# Patient Record
Sex: MALE | Race: WHITE | NOT HISPANIC OR LATINO | Employment: FULL TIME | URBAN - METROPOLITAN AREA
[De-identification: names, ages, dates, MRNs, and addresses within clinical notes are randomized per-mention and may not be internally consistent; named-entity substitution may affect disease eponyms.]

---

## 2023-04-21 ENCOUNTER — HOSPITAL ENCOUNTER (OUTPATIENT)
Dept: DATA CONVERSION | Facility: HOSPITAL | Age: 58
End: 2023-04-21
Attending: INTERNAL MEDICINE | Admitting: INTERNAL MEDICINE
Payer: COMMERCIAL

## 2023-04-21 DIAGNOSIS — K51.90 ULCERATIVE COLITIS, UNSPECIFIED, WITHOUT COMPLICATIONS (MULTI): ICD-10-CM

## 2023-04-21 DIAGNOSIS — K64.4 RESIDUAL HEMORRHOIDAL SKIN TAGS: ICD-10-CM

## 2023-04-21 DIAGNOSIS — Z12.11 ENCOUNTER FOR SCREENING FOR MALIGNANT NEOPLASM OF COLON: ICD-10-CM

## 2023-04-21 DIAGNOSIS — K51.80 OTHER ULCERATIVE COLITIS WITHOUT COMPLICATIONS (MULTI): ICD-10-CM

## 2023-05-01 LAB
COMPLETE PATHOLOGY REPORT: NORMAL
CONVERTED CLINICAL DIAGNOSIS-HISTORY: NORMAL
CONVERTED FINAL DIAGNOSIS: NORMAL
CONVERTED FINAL REPORT PDF LINK TO COPY AND PASTE: NORMAL
CONVERTED GROSS DESCRIPTION: NORMAL

## 2023-08-03 ENCOUNTER — APPOINTMENT (OUTPATIENT)
Dept: PRIMARY CARE | Facility: CLINIC | Age: 58
End: 2023-08-03
Payer: COMMERCIAL

## 2023-08-15 ENCOUNTER — OFFICE VISIT (OUTPATIENT)
Dept: PRIMARY CARE | Facility: CLINIC | Age: 58
End: 2023-08-15
Payer: COMMERCIAL

## 2023-08-15 VITALS
HEART RATE: 102 BPM | OXYGEN SATURATION: 92 % | DIASTOLIC BLOOD PRESSURE: 80 MMHG | SYSTOLIC BLOOD PRESSURE: 122 MMHG | BODY MASS INDEX: 30.72 KG/M2 | WEIGHT: 208 LBS | TEMPERATURE: 96.4 F

## 2023-08-15 DIAGNOSIS — S69.91XA INJURY OF RIGHT WRIST, INITIAL ENCOUNTER: Primary | ICD-10-CM

## 2023-08-15 PROCEDURE — 99214 OFFICE O/P EST MOD 30 MIN: CPT | Performed by: FAMILY MEDICINE

## 2023-08-15 RX ORDER — INFLIXIMAB 100 MG/10ML
900 INJECTION, POWDER, LYOPHILIZED, FOR SOLUTION INTRAVENOUS
COMMUNITY
Start: 2023-04-20 | End: 2023-10-17 | Stop reason: WASHOUT

## 2023-08-15 RX ORDER — IBUPROFEN 800 MG/1
1 TABLET ORAL
COMMUNITY
Start: 2022-11-13

## 2023-08-15 RX ORDER — VIT C/E/ZN/COPPR/LUTEIN/ZEAXAN 250MG-90MG
1 CAPSULE ORAL
COMMUNITY
Start: 2013-11-05

## 2023-08-15 RX ORDER — FOLIC ACID 1 MG/1
TABLET ORAL
COMMUNITY
End: 2024-02-14 | Stop reason: SDUPTHER

## 2023-08-15 RX ORDER — VALACYCLOVIR HYDROCHLORIDE 1 G/1
TABLET, FILM COATED ORAL
COMMUNITY
Start: 2023-03-08

## 2023-08-15 RX ORDER — UBIDECARENONE 30 MG
1 CAPSULE ORAL DAILY
COMMUNITY
Start: 2015-01-15

## 2023-08-15 RX ORDER — GUAIFENESIN 1200 MG
2 TABLET, EXTENDED RELEASE 12 HR ORAL
COMMUNITY
Start: 2018-03-01

## 2023-08-15 RX ORDER — SULFASALAZINE 500 MG/1
1500 TABLET ORAL 2 TIMES DAILY
COMMUNITY
End: 2024-02-14 | Stop reason: SDUPTHER

## 2023-08-15 RX ORDER — SODIUM CHLORIDE 0.9 % (FLUSH) 0.9 %
10 SYRINGE (ML) INJECTION
COMMUNITY
Start: 2023-04-20 | End: 2024-04-20

## 2023-08-15 RX ORDER — HYDROGEN PEROXIDE 3 %
1 SOLUTION, NON-ORAL MISCELLANEOUS
COMMUNITY
Start: 2018-03-01

## 2023-08-15 RX ORDER — EPINEPHRINE 1 MG/ML
0.3 INJECTION, SOLUTION, CONCENTRATE INTRAVENOUS
COMMUNITY
Start: 2023-04-20 | End: 2024-04-20

## 2023-08-15 ASSESSMENT — ENCOUNTER SYMPTOMS
ABDOMINAL PAIN: 1
BLOOD IN STOOL: 0
WEAKNESS: 0
SORE THROAT: 0
APPETITE CHANGE: 0
CONSTIPATION: 1
NUMBNESS: 0
EYE DISCHARGE: 0
FEVER: 0
JOINT SWELLING: 0
DYSURIA: 0
UNEXPECTED WEIGHT CHANGE: 0
HEADACHES: 0
WHEEZING: 0
PALPITATIONS: 0
ACTIVITY CHANGE: 0
DYSPHORIC MOOD: 0
FLANK PAIN: 0
VOMITING: 0
HEMATURIA: 0
SLEEP DISTURBANCE: 0
COUGH: 0
NAUSEA: 0
EYE ITCHING: 0
LIGHT-HEADEDNESS: 0
RHINORRHEA: 0
ARTHRALGIAS: 1
MYALGIAS: 0
SHORTNESS OF BREATH: 0
DIZZINESS: 0
DIARRHEA: 1
NERVOUS/ANXIOUS: 0
COLOR CHANGE: 1
SINUS PRESSURE: 0

## 2023-08-15 NOTE — PROGRESS NOTES
"Subjective   Patient ID: Harvey Owens is a 58 y.o. female who presents for Establish Care and Joint Swelling (Possible \"pinch\" in the R wrist about 3 weeks ago.  Shortly after that he noted a spider that had bit him also, on the R wrist.  He has had on and off swelling since.  Harvey also mentions a HX of gout and surgical procedure on the same wrist.).    HPI PATIENT IS TREATED FOR ULCERATIVE COLITIS AND IS ON REMACAID AND OTHER IMMUNOSUPPRESSANTS   HE HAS NORMAL BLOOD PRESSURE   HIS BMI IS 30     Review of Systems   Constitutional:  Negative for activity change, appetite change, fever and unexpected weight change.   HENT:  Negative for congestion, ear pain, postnasal drip, rhinorrhea, sinus pressure and sore throat.    Eyes:  Negative for discharge, itching and visual disturbance.   Respiratory:  Negative for cough, shortness of breath and wheezing.    Cardiovascular:  Negative for chest pain, palpitations and leg swelling.   Gastrointestinal:  Positive for abdominal pain, constipation and diarrhea. Negative for blood in stool, nausea and vomiting.   Endocrine: Negative for cold intolerance, heat intolerance and polyuria.   Genitourinary:  Negative for dysuria, flank pain and hematuria.   Musculoskeletal:  Positive for arthralgias. Negative for gait problem, joint swelling and myalgias.   Skin:  Positive for color change. Negative for rash.        EXCESSIVELY TANNED    Allergic/Immunologic: Negative for environmental allergies and food allergies.   Neurological:  Negative for dizziness, syncope, weakness, light-headedness, numbness and headaches.   Psychiatric/Behavioral:  Negative for dysphoric mood and sleep disturbance. The patient is not nervous/anxious.        Objective   /80   Pulse 102   Temp 35.8 °C (96.4 °F)   Wt 94.3 kg (208 lb)   SpO2 92%   BMI 30.72 kg/m²     Physical Exam  Constitutional:       Appearance: Normal appearance.   HENT:      Head: Normocephalic and atraumatic.      Nose: Nose " normal.      Mouth/Throat:      Mouth: Mucous membranes are moist.   Eyes:      Extraocular Movements: Extraocular movements intact.      Pupils: Pupils are equal, round, and reactive to light.   Cardiovascular:      Rate and Rhythm: Normal rate and regular rhythm.   Pulmonary:      Effort: Pulmonary effort is normal.      Breath sounds: Normal breath sounds.   Abdominal:      Palpations: Abdomen is soft.   Musculoskeletal:         General: Swelling, tenderness and deformity present. Normal range of motion.      Cervical back: Normal range of motion and neck supple.      Comments: Right wrist swollen and painful    Skin:     General: Skin is warm and dry.   Neurological:      General: No focal deficit present.      Mental Status: She is alert and oriented to person, place, and time.   Psychiatric:         Mood and Affect: Mood normal.         Behavior: Behavior normal.         Assessment/Plan   Diagnoses and all orders for this visit:  Injury of right wrist, initial encounter  -     XR wrist right 3+ views; Future

## 2023-08-21 ENCOUNTER — TELEPHONE (OUTPATIENT)
Dept: PRIMARY CARE | Facility: CLINIC | Age: 58
End: 2023-08-21

## 2023-08-21 ENCOUNTER — APPOINTMENT (OUTPATIENT)
Dept: PRIMARY CARE | Facility: CLINIC | Age: 58
End: 2023-08-21
Payer: COMMERCIAL

## 2023-08-22 DIAGNOSIS — M19.131 POST-TRAUMATIC OSTEOARTHRITIS OF RIGHT WRIST: Primary | ICD-10-CM

## 2023-09-28 VITALS — WEIGHT: 216.05 LBS | BODY MASS INDEX: 31.91 KG/M2

## 2023-09-28 DIAGNOSIS — K51.011 ULCERATIVE PANCOLITIS WITH RECTAL BLEEDING (MULTI): Primary | ICD-10-CM

## 2023-09-28 RX ORDER — DIPHENHYDRAMINE HYDROCHLORIDE 50 MG/ML
50 INJECTION INTRAMUSCULAR; INTRAVENOUS AS NEEDED
Status: CANCELLED | OUTPATIENT
Start: 2023-10-24

## 2023-09-28 RX ORDER — ALBUTEROL SULFATE 0.83 MG/ML
3 SOLUTION RESPIRATORY (INHALATION) AS NEEDED
Status: CANCELLED | OUTPATIENT
Start: 2023-10-24

## 2023-09-28 RX ORDER — EPINEPHRINE 0.3 MG/.3ML
0.3 INJECTION SUBCUTANEOUS EVERY 5 MIN PRN
Status: CANCELLED | OUTPATIENT
Start: 2023-10-24

## 2023-09-28 RX ORDER — FAMOTIDINE 10 MG/ML
20 INJECTION INTRAVENOUS ONCE AS NEEDED
Status: CANCELLED | OUTPATIENT
Start: 2023-10-24

## 2023-09-28 RX ORDER — DIPHENHYDRAMINE HCL 25 MG
25 CAPSULE ORAL ONCE
Status: CANCELLED | OUTPATIENT
Start: 2023-10-24 | End: 2023-10-05

## 2023-09-28 RX ORDER — ACETAMINOPHEN 325 MG/1
650 TABLET ORAL ONCE
Status: CANCELLED | OUTPATIENT
Start: 2023-10-24 | End: 2023-10-05

## 2023-10-17 DIAGNOSIS — K51.919 ULCERATIVE COLITIS WITH COMPLICATION, UNSPECIFIED LOCATION (MULTI): Primary | ICD-10-CM

## 2023-10-21 PROBLEM — R10.31 SEVERE RT GROIN PAIN: Status: ACTIVE | Noted: 2023-10-21

## 2023-10-21 PROBLEM — B00.9 HERPESVIRAL INFECTION, UNSPECIFIED: Status: ACTIVE | Noted: 2023-03-14

## 2023-10-21 PROBLEM — M75.120 COMPLETE ROTATOR CUFF TEAR: Status: ACTIVE | Noted: 2023-10-21

## 2023-10-21 PROBLEM — L81.4 OTHER MELANIN HYPERPIGMENTATION: Status: ACTIVE | Noted: 2023-03-14

## 2023-10-21 PROBLEM — D48.5 NEOPLASM OF UNCERTAIN BEHAVIOR OF SKIN: Status: ACTIVE | Noted: 2023-03-14

## 2023-10-21 PROBLEM — Z96.649 LOOSENING OF PROSTHETIC HIP (CMS-HCC): Status: ACTIVE | Noted: 2023-10-21

## 2023-10-21 PROBLEM — B35.1 ONYCHOMYCOSIS OF TOENAIL: Status: ACTIVE | Noted: 2023-10-21

## 2023-10-21 PROBLEM — D10.0 FIBROMA OF LIP: Status: ACTIVE | Noted: 2023-10-21

## 2023-10-21 PROBLEM — G57.10 MERALGIA PARESTHETICA: Status: ACTIVE | Noted: 2023-10-21

## 2023-10-21 PROBLEM — M75.40 IMPINGEMENT SYNDROME, SHOULDER: Status: ACTIVE | Noted: 2023-10-21

## 2023-10-21 PROBLEM — L91.8 OTHER HYPERTROPHIC DISORDERS OF THE SKIN: Status: ACTIVE | Noted: 2023-03-14

## 2023-10-21 PROBLEM — B99.9 INFECTION: Status: ACTIVE | Noted: 2023-10-21

## 2023-10-21 PROBLEM — M25.519 SHOULDER PAIN: Status: ACTIVE | Noted: 2023-10-21

## 2023-10-21 PROBLEM — J45.909 ASTHMA (HHS-HCC): Status: ACTIVE | Noted: 2023-10-21

## 2023-10-21 PROBLEM — M25.531 RIGHT WRIST PAIN: Status: ACTIVE | Noted: 2023-10-21

## 2023-10-21 PROBLEM — D18.01 HEMANGIOMA OF SKIN AND SUBCUTANEOUS TISSUE: Status: ACTIVE | Noted: 2023-03-14

## 2023-10-21 PROBLEM — K40.90 INGUINAL HERNIA: Status: ACTIVE | Noted: 2023-10-21

## 2023-10-21 PROBLEM — M19.031 ARTHRITIS OF WRIST, RIGHT: Status: ACTIVE | Noted: 2023-10-21

## 2023-10-21 PROBLEM — L85.1 ACQUIRED PLANTAR POROKERATOSIS: Status: ACTIVE | Noted: 2023-10-21

## 2023-10-21 PROBLEM — E55.9 MILD VITAMIN D DEFICIENCY: Status: ACTIVE | Noted: 2023-10-21

## 2023-10-21 PROBLEM — M25.551 RIGHT HIP PAIN: Status: ACTIVE | Noted: 2023-10-21

## 2023-10-21 PROBLEM — D22.60 MELANOCYTIC NEVI OF UNSPECIFIED UPPER LIMB, INCLUDING SHOULDER: Status: ACTIVE | Noted: 2023-03-14

## 2023-10-21 PROBLEM — D22.5 MELANOCYTIC NEVI OF TRUNK: Status: ACTIVE | Noted: 2023-03-14

## 2023-10-21 PROBLEM — D84.9 IMMUNOSUPPRESSION (MULTI): Status: ACTIVE | Noted: 2023-10-21

## 2023-10-21 PROBLEM — T84.038A LOOSENING OF PROSTHETIC HIP (CMS-HCC): Status: ACTIVE | Noted: 2023-10-21

## 2023-10-21 PROBLEM — R05.3 CHRONIC COUGH: Status: ACTIVE | Noted: 2023-10-21

## 2023-10-21 PROBLEM — H93.8X2: Status: ACTIVE | Noted: 2023-10-21

## 2023-10-21 PROBLEM — B35.3 TINEA PEDIS OF BOTH FEET: Status: ACTIVE | Noted: 2023-10-21

## 2023-10-21 PROBLEM — R12 HEARTBURN: Status: ACTIVE | Noted: 2023-10-21

## 2023-10-21 RX ORDER — FERROUS SULFATE, DRIED 160(50) MG
1 TABLET, EXTENDED RELEASE ORAL DAILY
COMMUNITY

## 2023-10-24 ENCOUNTER — INFUSION (OUTPATIENT)
Dept: INFUSION THERAPY | Facility: CLINIC | Age: 58
End: 2023-10-24
Payer: COMMERCIAL

## 2023-10-24 VITALS
HEART RATE: 76 BPM | RESPIRATION RATE: 16 BRPM | BODY MASS INDEX: 32.8 KG/M2 | TEMPERATURE: 97.8 F | OXYGEN SATURATION: 93 % | SYSTOLIC BLOOD PRESSURE: 153 MMHG | WEIGHT: 222.11 LBS | DIASTOLIC BLOOD PRESSURE: 91 MMHG

## 2023-10-24 DIAGNOSIS — K51.011 ULCERATIVE PANCOLITIS WITH RECTAL BLEEDING (MULTI): ICD-10-CM

## 2023-10-24 PROCEDURE — 96375 TX/PRO/DX INJ NEW DRUG ADDON: CPT | Performed by: NURSE PRACTITIONER

## 2023-10-24 PROCEDURE — 96413 CHEMO IV INFUSION 1 HR: CPT | Performed by: NURSE PRACTITIONER

## 2023-10-24 RX ORDER — FAMOTIDINE 10 MG/ML
20 INJECTION INTRAVENOUS ONCE AS NEEDED
Status: CANCELLED | OUTPATIENT
Start: 2023-12-19

## 2023-10-24 RX ORDER — DIPHENHYDRAMINE HYDROCHLORIDE 50 MG/ML
50 INJECTION INTRAMUSCULAR; INTRAVENOUS AS NEEDED
Status: CANCELLED | OUTPATIENT
Start: 2023-12-19

## 2023-10-24 RX ORDER — ACETAMINOPHEN 325 MG/1
650 TABLET ORAL ONCE
Status: COMPLETED | OUTPATIENT
Start: 2023-10-24 | End: 2023-10-24

## 2023-10-24 RX ORDER — DIPHENHYDRAMINE HCL 25 MG
25 CAPSULE ORAL ONCE
Status: CANCELLED | OUTPATIENT
Start: 2023-12-19 | End: 2023-12-19

## 2023-10-24 RX ORDER — ACETAMINOPHEN 325 MG/1
650 TABLET ORAL ONCE
Status: CANCELLED | OUTPATIENT
Start: 2023-12-19 | End: 2023-12-19

## 2023-10-24 RX ORDER — DIPHENHYDRAMINE HCL 25 MG
25 CAPSULE ORAL ONCE
Status: COMPLETED | OUTPATIENT
Start: 2023-10-24 | End: 2023-10-24

## 2023-10-24 RX ORDER — EPINEPHRINE 0.3 MG/.3ML
0.3 INJECTION SUBCUTANEOUS EVERY 5 MIN PRN
Status: CANCELLED | OUTPATIENT
Start: 2023-12-19

## 2023-10-24 RX ORDER — ALBUTEROL SULFATE 0.83 MG/ML
3 SOLUTION RESPIRATORY (INHALATION) AS NEEDED
Status: CANCELLED | OUTPATIENT
Start: 2023-12-19

## 2023-10-24 RX ADMIN — Medication 25 MG: at 10:08

## 2023-10-24 RX ADMIN — ACETAMINOPHEN 650 MG: 325 TABLET ORAL at 10:08

## 2023-10-24 ASSESSMENT — ENCOUNTER SYMPTOMS
DIZZINESS: 0
HEMATURIA: 0
PALPITATIONS: 0
ABDOMINAL PAIN: 0
APPETITE CHANGE: 0
COUGH: 0
CONSTIPATION: 0
CHILLS: 0
LIGHT-HEADEDNESS: 0
WOUND: 0
VOMITING: 0
DIARRHEA: 0
WHEEZING: 0
HEADACHES: 0
TROUBLE SWALLOWING: 0
ARTHRALGIAS: 1
DYSURIA: 0
BLOOD IN STOOL: 0
FEVER: 0
UNEXPECTED WEIGHT CHANGE: 0
SHORTNESS OF BREATH: 0
LEG SWELLING: 0
FREQUENCY: 0
CHEST TIGHTNESS: 0
EXTREMITY WEAKNESS: 0
FATIGUE: 0
SORE THROAT: 0
NUMBNESS: 0
MYALGIAS: 0
NAUSEA: 0
EYE PROBLEMS: 0
DEPRESSION: 0
NERVOUS/ANXIOUS: 0

## 2023-10-24 ASSESSMENT — PAIN SCALES - GENERAL: PAINLEVEL: 0-NO PAIN

## 2023-10-24 NOTE — PATIENT INSTRUCTIONS
Today you received: INFLECTRA 900 MG EVERY 8 WEEKS  PREMEDICATED WITH TYLENOL 650 MG, BENADRYL 25 MG, SOLU-MEDROL 100MG    For:   1. Ulcerative pancolitis with rectal bleeding (CMS/[facility])          Please read the  Medication Guide that was given to you and reviewed during todays visit.     (Tell all doctors including dentists that you are taking this medication)     Go to the emergency room or call 911 if:  -You have signs of allergic reaction:   o         Rash, hives, itching.   o         Swollen, blistered, peeling skin.   o         Swelling of face, lips, mouth, tongue or throat.   o         Tightness of chest, trouble breathing, swallowing or talking      Call your doctor:     - If IV / injection site gets red, warm, swollen, itchy or leaks fluid or pus.     (Leave dressing on your IV site for at least 2 hours and keep area clean and dry  - If you get sick or have symptoms of infection or are not feeling well for any reason.    (Wash your hands often, stay away from people who are sick)  - If you have side effects from your medication that do not go away or are bothersome.     (Refer to the teaching your nurse gave you for side effects to call your doctor about)     Common side effects may include:  stuffy nose, headache, feeling tired, muscle aches, upset stomach  - Before receiving any vaccines, Call [facility] at   if:  - You get sick, are on antibiotics, have had a recent vaccine, have surgery or dental work and your doctor wants your visit rescheduled.  - You need to cancel and reschedule your visit for any reason. Call at least 2 days before your visit if you need to cancel.   - Your insurance changes before your next visit.    (We will need to get approval from your new insurance. This can take up to two weeks.)     The [facility] is opened Monday thru Friday. We are closed on weekends and holidays.     Voice mail will take your call if the center is closed. If you  leave a message please allow 24 hours for a call back during weekdays. If you leave a message on a weekend/holiday, we will call you back the next business day.

## 2023-10-24 NOTE — PROGRESS NOTES
[facility]   infusion Clinic Note   Date: 2023   Name: Raleigh Owens  : 1965   MRN: [id number]         Reason for Visit: Follow-up and OP Infusion (PT HERE FOR INFLECTRA 900 MG INFUSION Q 8 WEEKS)       Visit Type: INFUSION     Ordered By: DR. ALANIS   Accompanied by:Self      Diagnosis: Ulcerative pancolitis with rectal bleeding (CMS/HCC)      Allergies:   Allergies as of 10/24/2023    (No Known Allergies)        Current Medications has a current medication list which includes the following prescription(s): acetaminophen, calcium carbonate-vitamin d3, cholecalciferol, epinephrine hcl (pf), esomeprazole, folic acid, ibuprofen, infliximab-dyyb, essential man, sodium chloride, sulfasalazine, and valacyclovir.          Vitals:  Vitals:    10/24/23 0946 10/24/23 1144   BP: 166/82 (!) 153/91   Pulse: 90 76   Resp: 16 16   Temp: 35.9 °C (96.6 °F) 36.6 °C (97.8 °F)   SpO2: 94% 93%   Weight: 101 kg (222 lb 1.8 oz)           Infusion Pre-procedure Checklist:   Allergies reviewed: yes   Medications reviewed: yes     Previous reaction to current treatment: No      Assess patient for the concerns below. Document provider notification as appropriate.  - Active or recent infection with/without current antibiotic use: no  - Recent or planned invasive dental work: no  - Recent or planned surgeries: no  - Recently received or plans to receive vaccinations: no  - Has treatment related toxicities: n/a  - Is pregnant:  n/a    - Does the patient meet criteria to treat? Yes    Provider notified? Not applicable      Pain: 0    Is the pain different from normal: n/a   Is the pain tolerable: n/a   Is your Doctor aware: n/a       Labs: None      Fall Risk Screening: Deepika Fall Risk  History of Falling, Immediate or Within 3 Months: No  Secondary Diagnosis: Yes  Ambulatory Aid: Walks without aid/bedrest/nurse assist  Intravenous Therapy/Heparin Lock: Yes  Gait/Transferring:  Normal/bedrest/immobile  Mental Status: Oriented to own ability  Poe Fall Risk Score: 35       Review of Systems   Constitutional:  Negative for appetite change, chills, fatigue, fever and unexpected weight change.   HENT:   Negative for hearing loss, mouth sores, sore throat, tinnitus and trouble swallowing.    Eyes:  Negative for eye problems.   Respiratory:  Negative for chest tightness, cough, shortness of breath and wheezing.    Cardiovascular:  Negative for chest pain, leg swelling and palpitations.   Gastrointestinal:  Negative for abdominal pain, blood in stool, constipation, diarrhea, nausea and vomiting.        PT WITH A DX OF IBD REPORTS #   2-3  FORMED BM'S PER DAY. DENIES NOTING BLOOD/MUCUS TO STOOLS. DENIES C/O OF ABDOMINAL PAIN AND NOCTURNAL STOOLING.     Genitourinary:  Negative for dysuria, frequency and hematuria.    Musculoskeletal:  Positive for arthralgias. Negative for gait problem and myalgias.   Skin:  Negative for itching, rash and wound.   Neurological:  Negative for dizziness, extremity weakness, gait problem, headaches, light-headedness and numbness.   Psychiatric/Behavioral:  Negative for depression. The patient is not nervous/anxious.          Infusion Readiness:   Assessment Concerns Related to Infusion: No  Provider notified: n/a      Document Below Only If Indicated:   New Patient Education:  N/A (returning patient for continuation of therapy. Ongoing education provided as needed.)       Drug Specific Questions:  InFLIXimab  (AVSOLA, INFLECTRA, REMICADE, RENFLEXIS)  REMINDER: WEIGHT BASED DRUG      Weight Based Drug Calculations:  WEIGHT BASED DRUGS: Infliximab (REMICADE, INFLECTRA, RENFLEXIS) Patient's dosing weight (kg): 98 NKG    10% weight variance for prescribed treatment: 88.2 kg to 107.8 kg     Patient's weight today: 101 kg     Patient weight today falls outside of 10% variance or  weight range: NO      Home Care pharmacist informed of weight variance:  N/A      Doses that are weight based have an acceptable variance rule within 10% of the prescribed   order and/or within  weight range. If patient weight on day of infusion falls   outside of the 10% variance, or weight range, infusion is administered and   pharmacy contacted regarding future dosing adjustments, per policy.          Initiated By: Melissa Cota RN   Time: 12:05 PM     We administered acetaminophen, methylPREDNISolone sod succinate (PF), diphenhydrAMINE, and inFLIXimab-dyyb (Inflectra) 900 mg in sodium chloride 0.9% 250 mL IV*.

## 2023-12-18 ENCOUNTER — INFUSION (OUTPATIENT)
Dept: INFUSION THERAPY | Facility: CLINIC | Age: 58
End: 2023-12-18
Payer: COMMERCIAL

## 2023-12-18 VITALS
OXYGEN SATURATION: 93 % | TEMPERATURE: 97 F | BODY MASS INDEX: 32.88 KG/M2 | WEIGHT: 222.66 LBS | SYSTOLIC BLOOD PRESSURE: 150 MMHG | RESPIRATION RATE: 16 BRPM | HEART RATE: 76 BPM | DIASTOLIC BLOOD PRESSURE: 89 MMHG

## 2023-12-18 DIAGNOSIS — K51.011 ULCERATIVE PANCOLITIS WITH RECTAL BLEEDING (MULTI): ICD-10-CM

## 2023-12-18 PROCEDURE — 96413 CHEMO IV INFUSION 1 HR: CPT | Performed by: NURSE PRACTITIONER

## 2023-12-18 PROCEDURE — 96375 TX/PRO/DX INJ NEW DRUG ADDON: CPT | Performed by: NURSE PRACTITIONER

## 2023-12-18 RX ORDER — ACETAMINOPHEN 325 MG/1
650 TABLET ORAL ONCE
Status: COMPLETED | OUTPATIENT
Start: 2023-12-18 | End: 2023-12-18

## 2023-12-18 RX ORDER — FAMOTIDINE 10 MG/ML
20 INJECTION INTRAVENOUS ONCE AS NEEDED
Status: CANCELLED | OUTPATIENT
Start: 2023-12-19

## 2023-12-18 RX ORDER — ALBUTEROL SULFATE 0.83 MG/ML
3 SOLUTION RESPIRATORY (INHALATION) AS NEEDED
Status: CANCELLED | OUTPATIENT
Start: 2023-12-19

## 2023-12-18 RX ORDER — METHYLPREDNISOLONE SODIUM SUCCINATE 125 MG/2ML
100 INJECTION INTRAMUSCULAR; INTRAVENOUS ONCE
Status: COMPLETED | OUTPATIENT
Start: 2023-12-18 | End: 2023-12-18

## 2023-12-18 RX ORDER — DIPHENHYDRAMINE HCL 25 MG
25 CAPSULE ORAL ONCE
Status: CANCELLED | OUTPATIENT
Start: 2023-12-19 | End: 2023-12-19

## 2023-12-18 RX ORDER — EPINEPHRINE 0.3 MG/.3ML
0.3 INJECTION SUBCUTANEOUS EVERY 5 MIN PRN
Status: CANCELLED | OUTPATIENT
Start: 2023-12-19

## 2023-12-18 RX ORDER — DIPHENHYDRAMINE HYDROCHLORIDE 50 MG/ML
50 INJECTION INTRAMUSCULAR; INTRAVENOUS AS NEEDED
Status: CANCELLED | OUTPATIENT
Start: 2023-12-19

## 2023-12-18 RX ORDER — ACETAMINOPHEN 325 MG/1
650 TABLET ORAL ONCE
Status: CANCELLED | OUTPATIENT
Start: 2023-12-19 | End: 2023-12-19

## 2023-12-18 RX ORDER — DIPHENHYDRAMINE HCL 25 MG
25 CAPSULE ORAL ONCE
Status: COMPLETED | OUTPATIENT
Start: 2023-12-18 | End: 2023-12-18

## 2023-12-18 RX ADMIN — Medication 25 MG: at 11:35

## 2023-12-18 RX ADMIN — METHYLPREDNISOLONE SODIUM SUCCINATE 100 MG: 125 INJECTION INTRAMUSCULAR; INTRAVENOUS at 11:35

## 2023-12-18 RX ADMIN — ACETAMINOPHEN 650 MG: 325 TABLET ORAL at 11:30

## 2023-12-18 ASSESSMENT — ENCOUNTER SYMPTOMS
NERVOUS/ANXIOUS: 0
CHILLS: 0
SLEEP DISTURBANCE: 0
HEADACHES: 0
COUGH: 0
VOMITING: 0
FEVER: 0
DIARRHEA: 0
PALPITATIONS: 0
EXTREMITY WEAKNESS: 0
WOUND: 0
DIFFICULTY URINATING: 0
DYSURIA: 0
BLOOD IN STOOL: 0
NAUSEA: 0
WHEEZING: 0
CONFUSION: 0
NUMBNESS: 0
CONSTIPATION: 0
ABDOMINAL PAIN: 0
ARTHRALGIAS: 0
FREQUENCY: 0
SHORTNESS OF BREATH: 0
TROUBLE SWALLOWING: 0
EYE PROBLEMS: 0
DIZZINESS: 0
DEPRESSION: 0
LIGHT-HEADEDNESS: 0
APPETITE CHANGE: 0
UNEXPECTED WEIGHT CHANGE: 0
FATIGUE: 0
LEG SWELLING: 0
MYALGIAS: 0
SORE THROAT: 0
HEMATURIA: 0

## 2023-12-18 NOTE — PATIENT INSTRUCTIONS
Today :We administered acetaminophen, methylPREDNISolone sodium succinate (PF), diphenhydrAMINE, and inFLIXimab-dyyb (Inflectra) 900 mg in sodium chloride 0.9% 250 mL IV*.     For:   1. Ulcerative pancolitis with rectal bleeding (CMS/HCC)         Your next appointment is due in:  56 DAYS         Please read the  Medication Guide that was given to you and reviewed during todays visit.     (Tell all doctors including dentists that you are taking this medication)     Go to the emergency room or call 911 if:  -You have signs of allergic reaction:   -Rash, hives, itching.   -Swollen, blistered, peeling skin.   -Swelling of face, lips, mouth, tongue or throat.   -Tightness of chest, trouble breathing, swallowing or talking     Call your doctor:  - If IV / injection site gets red, warm, swollen, itchy or leaks fluid or pus.     (Leave dressing on your IV site for at least 2 hours and keep area clean and dry  - If you get sick or have symptoms of infection or are not feeling well for any reason.    (Wash your hands often, stay away from people who are sick)  - If you have side effects from your medication that do not go away or are bothersome.     (Refer to the teaching your nurse gave you for side effects to call your doctor about)    - Common side effects may include:  stuffy nose, headache, feeling tired, muscle aches, upset stomach  - Before receiving any vaccines     - Call the [facility] at   If:  - You get sick, are on antibiotics, have had a recent vaccine, have surgery or dental work and your doctor wants your visit rescheduled.  - You need to cancel and reschedule your visit for any reason. Call at least 2 days before your visit if you need to cancel.   - Your insurance changes before your next visit.    (We will need to get approval from your new insurance. This can take up to two weeks.)     [facility] is opened Monday thru Friday. We are closed on weekends and holidays.    Voice mail will take your call if the center is closed. If you leave a message please allow 24 hours for a call back during weekdays. If you leave a message on a weekend/holiday, we will call you back the next business day.

## 2023-12-18 NOTE — PROGRESS NOTES
[facility]   infusion Clinic Note   Date: 2023   Name: Raleigh Owens  : 1965   MRN: [id number]         Reason for Visit: OP Infusion (PT HERE FOR Q56 DAY INFLECTRA 900 MG )       Visit Type: INFUSION     Ordered By: DR. ALANIS   Accompanied by:Self      Diagnosis: Ulcerative pancolitis with rectal bleeding (CMS/HCC)      Allergies:   Allergies as of 2023    (No Known Allergies)        Current Medications has a current medication list which includes the following prescription(s): acetaminophen, calcium carbonate-vitamin d3, cholecalciferol, epinephrine hcl (pf), esomeprazole, folic acid, ibuprofen, infliximab-dyyb, essential man, sodium chloride, sulfasalazine, and valacyclovir.          Vitals:  Vitals:    23 1106 23 1205 23 1305   BP: 151/86 153/90 150/89   Pulse: 99 82 76   Resp: 18 16 16   Temp: 36.5 °C (97.7 °F) 36.4 °C (97.6 °F) 36.1 °C (97 °F)   SpO2: 94% 95% 93%   Weight: 101 kg (222 lb 10.6 oz)            Infusion Pre-procedure Checklist:   Allergies reviewed: yes   Medications reviewed: yes     Previous reaction to current treatment: No      Assess patient for the concerns below. Document provider notification as appropriate.  - Active or recent infection with/without current antibiotic use: no  - Recent or planned invasive dental work: no  - Recent or planned surgeries: no  - Recently received or plans to receive vaccinations: no  - Has treatment related toxicities: n/a  - Is pregnant:  n/a    - Does the patient meet criteria to treat? Yes    Provider notified? Not applicable      Pain: 0    Is the pain different from normal: n/a   Is the pain tolerable: n/a   Is your Doctor aware: n/a       Labs: None      Fall Risk Screening: Poe Fall Risk  History of Falling, Immediate or Within 3 Months: No  Secondary Diagnosis: No  Ambulatory Aid: Walks without aid/bedrest/nurse assist  Intravenous Therapy/Heparin Lock: Yes  Gait/Transferring:  Normal/bedrest/immobile  Mental Status: Oriented to own ability  Poe Fall Risk Score: 20       Review of Systems   Constitutional:  Negative for appetite change, chills, fatigue, fever and unexpected weight change.   HENT:   Negative for hearing loss, mouth sores, sore throat, tinnitus and trouble swallowing.    Eyes:  Negative for eye problems.   Respiratory:  Negative for cough, shortness of breath and wheezing.    Cardiovascular:  Negative for chest pain, leg swelling and palpitations.   Gastrointestinal:  Negative for abdominal pain, blood in stool, constipation, diarrhea, nausea and vomiting.        PT WITH A DX OF IBD REPORTS #  2-3  FORMED BM'S PER DAY. DENIES NOTING BLOOD/MUCUS TO STOOLS. DENIES C/O OF ABDOMINAL PAIN AND NOCTURNAL STOOLING.     Genitourinary:  Negative for bladder incontinence, difficulty urinating, dysuria, frequency and hematuria.    Musculoskeletal:  Negative for arthralgias, gait problem and myalgias.   Skin:  Negative for itching, rash and wound.   Neurological:  Negative for dizziness, extremity weakness, gait problem, headaches, light-headedness and numbness.   Psychiatric/Behavioral:  Negative for confusion, depression, sleep disturbance and suicidal ideas. The patient is not nervous/anxious.          Infusion Readiness:   Assessment Concerns Related to Infusion: No  Provider notified: n/a      Document Below Only If Indicated:   New Patient Education:  N/A (returning patient for continuation of therapy. Ongoing education provided as needed.)       Drug Specific Questions:  InFLIXimab  (AVSOLA, INFLECTRA, REMICADE, RENFLEXIS)  REMINDER: WEIGHT BASED DRUG      Weight Based Drug Calculations:  WEIGHT BASED DRUGS: Infliximab (REMICADE, INFLECTRA, RENFLEXIS) Patient's dosing weight (kg): 98 NKG    10% weight variance for prescribed treatment: 88.2 kg to 107.8 kg     Patient's weight today: 101 kg     Patient weight today falls outside of 10% variance or  weight range: NO       Home Care pharmacist informed of weight variance:  N/A     Doses that are weight based have an acceptable variance rule within 10% of the prescribed   order and/or within  weight range. If patient weight on day of infusion falls   outside of the 10% variance, or weight range, infusion is administered and   pharmacy contacted regarding future dosing adjustments, per policy.          Initiated By: Faye Aldana RN   Time: 1:21 PM     We administered acetaminophen, methylPREDNISolone sodium succinate (PF), diphenhydrAMINE, and inFLIXimab-dyyb (Inflectra) 900 mg in sodium chloride 0.9% 250 mL IV*.

## 2024-02-12 ENCOUNTER — INFUSION (OUTPATIENT)
Dept: INFUSION THERAPY | Facility: CLINIC | Age: 59
End: 2024-02-12
Payer: COMMERCIAL

## 2024-02-12 VITALS
OXYGEN SATURATION: 97 % | BODY MASS INDEX: 32.96 KG/M2 | RESPIRATION RATE: 18 BRPM | SYSTOLIC BLOOD PRESSURE: 145 MMHG | DIASTOLIC BLOOD PRESSURE: 84 MMHG | WEIGHT: 223.22 LBS | HEART RATE: 80 BPM | TEMPERATURE: 97.3 F

## 2024-02-12 DIAGNOSIS — K51.011 ULCERATIVE PANCOLITIS WITH RECTAL BLEEDING (MULTI): Primary | ICD-10-CM

## 2024-02-12 PROCEDURE — 96375 TX/PRO/DX INJ NEW DRUG ADDON: CPT | Performed by: NURSE PRACTITIONER

## 2024-02-12 PROCEDURE — 96413 CHEMO IV INFUSION 1 HR: CPT | Performed by: NURSE PRACTITIONER

## 2024-02-12 RX ORDER — DIPHENHYDRAMINE HYDROCHLORIDE 50 MG/ML
50 INJECTION INTRAMUSCULAR; INTRAVENOUS AS NEEDED
Status: CANCELLED | OUTPATIENT
Start: 2024-04-08

## 2024-02-12 RX ORDER — ACETAMINOPHEN 325 MG/1
650 TABLET ORAL ONCE
Status: CANCELLED | OUTPATIENT
Start: 2024-04-08 | End: 2024-04-08

## 2024-02-12 RX ORDER — ALBUTEROL SULFATE 0.83 MG/ML
3 SOLUTION RESPIRATORY (INHALATION) AS NEEDED
Status: CANCELLED | OUTPATIENT
Start: 2024-04-08

## 2024-02-12 RX ORDER — EPINEPHRINE 0.3 MG/.3ML
0.3 INJECTION SUBCUTANEOUS EVERY 5 MIN PRN
Status: CANCELLED | OUTPATIENT
Start: 2024-04-08

## 2024-02-12 RX ORDER — ACETAMINOPHEN 325 MG/1
650 TABLET ORAL ONCE
Status: COMPLETED | OUTPATIENT
Start: 2024-02-12 | End: 2024-02-12

## 2024-02-12 RX ORDER — DIPHENHYDRAMINE HCL 25 MG
25 CAPSULE ORAL ONCE
Status: COMPLETED | OUTPATIENT
Start: 2024-02-12 | End: 2024-02-12

## 2024-02-12 RX ORDER — DIPHENHYDRAMINE HCL 25 MG
25 CAPSULE ORAL ONCE
Status: CANCELLED | OUTPATIENT
Start: 2024-04-08 | End: 2024-04-08

## 2024-02-12 RX ORDER — FAMOTIDINE 10 MG/ML
20 INJECTION INTRAVENOUS ONCE AS NEEDED
Status: CANCELLED | OUTPATIENT
Start: 2024-04-08

## 2024-02-12 RX ADMIN — ACETAMINOPHEN 650 MG: 325 TABLET ORAL at 11:30

## 2024-02-12 RX ADMIN — Medication 25 MG: at 11:20

## 2024-02-12 ASSESSMENT — ENCOUNTER SYMPTOMS
CONSTIPATION: 0
MYALGIAS: 0
FREQUENCY: 0
LEG SWELLING: 0
ROS GI COMMENTS: PT WITH A DX OF IBD REPORTS #  2 FORMED  BM'S PER DAY. DENIES NOTING BLOOD/MUCUS TO STOOLS.  DENIES C/O OF ABDOMINAL PAIN AND/OR BOUTS OF NOCTURNAL STOOLING.
ARTHRALGIAS: 0
SORE THROAT: 0
CHILLS: 0
NUMBNESS: 0
ABDOMINAL PAIN: 0
ABDOMINAL DISTENTION: 0
UNEXPECTED WEIGHT CHANGE: 0
VOMITING: 0
WHEEZING: 0
PALPITATIONS: 0
EYE PROBLEMS: 0
SHORTNESS OF BREATH: 0
EXTREMITY WEAKNESS: 0
FEVER: 0
LIGHT-HEADEDNESS: 0
FATIGUE: 0
TROUBLE SWALLOWING: 0
COUGH: 0
HEMATURIA: 0
DIZZINESS: 0
APPETITE CHANGE: 0
NAUSEA: 0
BLOOD IN STOOL: 0
WOUND: 0
VOICE CHANGE: 0
DYSURIA: 0
HEADACHES: 0
DIARRHEA: 0

## 2024-02-12 NOTE — PROGRESS NOTES
[facility]   infusion Clinic Note   Date: 2024   Name: Raleigh Owens  : 1965   MRN: [id number]         Reason for Visit: OP Infusion (INFLECTRA 900MG EVERY 8 WEEKS)         Visit Type: INFUSION       Ordered By: DR ALANIS      Accompanied by:Self      Diagnosis: Ulcerative pancolitis with rectal bleeding (CMS/HCC)       Allergies:   Allergies as of 2024    (No Known Allergies)         Current Medications has a current medication list which includes the following prescription(s): acetaminophen, calcium carbonate-vitamin d3, cholecalciferol, epinephrine hcl (pf), esomeprazole, folic acid, ibuprofen, infliximab-dyyb, essential man, sodium chloride, sulfasalazine, and valacyclovir.       Vitals:   Vitals:    24 1050 24 1255   BP: (!) 153/92 145/84   Pulse: 99 80   Resp: 18 18   Temp: 36.7 °C (98.1 °F) 36.3 °C (97.3 °F)   SpO2: 99% 97%   Weight: 101 kg (223 lb 3.5 oz)              Infusion Pre-procedure Checklist:   - Allergies reviewed: yes   - Medications reviewed: yes       - Previous reaction to current treatment: no      Assess patient for the concerns below. Document provider notification as appropriate.  - Active or recent infection with/without current antibiotic use: no  - Recent or planned invasive dental work: no  - Recent or planned surgeries: no  - Recently received or plans to receive vaccinations: no  - Has treatment related toxicities: no  - Is pregnant:  n/a      Pain: 0   - Is the pain different from normal: n/a   - Is the pain tolerable: n/a   - Is your Doctor aware:  n/a      Labs: N/A         Fall Risk Screening: Poe Fall Risk  History of Falling, Immediate or Within 3 Months: No  Secondary Diagnosis: No  Ambulatory Aid: Walks without aid/bedrest/nurse assist  Intravenous Therapy/Heparin Lock: Yes  Gait/Transferring: Normal/bedrest/immobile  Mental Status: Oriented to own ability  Poe Fall Risk Score: 20       Review Of  Systems:  Review of Systems   Constitutional:  Negative for appetite change, chills, fatigue, fever and unexpected weight change.   HENT:   Negative for hearing loss, mouth sores, sore throat, tinnitus, trouble swallowing and voice change.    Eyes:  Negative for eye problems.   Respiratory:  Negative for cough, shortness of breath and wheezing.    Cardiovascular:  Negative for chest pain, leg swelling and palpitations.   Gastrointestinal:  Negative for abdominal distention, abdominal pain, blood in stool, constipation, diarrhea, nausea and vomiting.        PT WITH A DX OF IBD REPORTS #  2 FORMED  BM'S PER DAY. denies NOTING BLOOD/MUCUS TO STOOLS.  denies C/O OF ABDOMINAL PAIN AND/OR BOUTS OF NOCTURNAL STOOLING.     Genitourinary:  Negative for dysuria, frequency and hematuria.    Musculoskeletal:  Negative for arthralgias and myalgias.   Skin:  Negative for itching, rash and wound.   Neurological:  Negative for dizziness, extremity weakness, headaches, light-headedness and numbness.         Infusion Readiness:   - Assessment Concerns Related to Infusion: No  - Provider notified: n/a      Document Below Only If Indicated:   New Patient Education:    N/A (returning patient for continuation of therapy. Ongoing education provided as needed.)        Treatment Conditions & Drug Specific Questions:    InFLIXimab  (INFLECTRA)    (Unless otherwise specified on patient specific therapy plan):     TREATMENT CONDITIONS:  Unless otherwise specified on patient specific therapy plan HOLD and notify Provider prior to proceeding with today's infusion if patient with:  o Positive T-Spot  o Reactive Hep B sAg and/or Hep B Core Antibody    Lab Results   Component Value Date    TBSIN Negative 02/15/2023    QFG NEGATIVE 01/25/2018     HEP BSAG HISTORICALLY NEGATIVE     Lab Results   Component Value Date    HEPCAB NONREACTIVE 05/10/2022        Labs reviewed and patient meets treatment conditions? Yes    REMINDER:   WEIGHT BASED DRUG      Recommended Vitals/Observation:  Vitals:     Induction: Obtain vital signs every 30 minutes; at end of observation period and as needed.     Maintenance: Obtain vital signs at start and end of infusions  Observation:     Induction: Patient is monitored for 30 minutes post-infusion     Maintenance: No observation.        Weight Based Drug Calculations:    WEIGHT BASED DRUGS: Infliximab (REMICADE, INFLECTRA, RENFLEXIS)   Patient's dosing weight (kg): 98     10% weight variance for prescribed treatment: 88.2 kg to 107.8 kg     Patient's weight today:   Vitals:    02/12/24 1050   Weight: 101 kg (223 lb 3.5 oz)         weight range for prescribed dose:     Patient weight today falls outside of 10% variance or  weight range: No     Home Care pharmacist informed of weight variance: Not applicable    Doses that are weight based have an acceptable variance rule within 10% of the prescribed   order and/or within  weight range. If patient weight on day of infusion falls   outside of the 10% variance, or weight range, infusion is administered and   pharmacy contacted regarding future dosing adjustments, per policy.         Initiated By: IRIS Gibbs-MICHELLE   Time: 1:33 PM     We administered acetaminophen, methylPREDNISolone sod succinate, diphenhydrAMINE, and inFLIXimab-dyyb (Inflectra) 900 mg in sodium chloride 0.9% 250 mL IV*.

## 2024-02-14 DIAGNOSIS — K51.00 ULCERATIVE PANCOLITIS WITHOUT COMPLICATION (MULTI): Primary | ICD-10-CM

## 2024-02-14 RX ORDER — FOLIC ACID 1 MG/1
TABLET ORAL
Qty: 24 TABLET | Refills: 2 | Status: SHIPPED | OUTPATIENT
Start: 2024-02-14 | End: 2024-04-11 | Stop reason: SDUPTHER

## 2024-02-14 RX ORDER — SULFASALAZINE 500 MG/1
1500 TABLET ORAL 2 TIMES DAILY
Qty: 90 TABLET | Refills: 2 | Status: SHIPPED | OUTPATIENT
Start: 2024-02-14 | End: 2024-04-11 | Stop reason: SDUPTHER

## 2024-02-26 ENCOUNTER — LAB (OUTPATIENT)
Dept: LAB | Facility: LAB | Age: 59
End: 2024-02-26
Payer: COMMERCIAL

## 2024-02-26 ENCOUNTER — HOSPITAL ENCOUNTER (OUTPATIENT)
Dept: RADIOLOGY | Facility: CLINIC | Age: 59
Discharge: HOME | End: 2024-02-26
Payer: COMMERCIAL

## 2024-02-26 DIAGNOSIS — Z00.00 ENCOUNTER FOR GENERAL ADULT MEDICAL EXAMINATION WITHOUT ABNORMAL FINDINGS: ICD-10-CM

## 2024-02-26 DIAGNOSIS — Z12.5 ENCOUNTER FOR SCREENING FOR MALIGNANT NEOPLASM OF PROSTATE: ICD-10-CM

## 2024-02-26 DIAGNOSIS — M25.472 EFFUSION, LEFT ANKLE: ICD-10-CM

## 2024-02-26 DIAGNOSIS — Z87.39 PERSONAL HISTORY OF OTHER DISEASES OF THE MUSCULOSKELETAL SYSTEM AND CONNECTIVE TISSUE: ICD-10-CM

## 2024-02-26 DIAGNOSIS — R06.2 WHEEZING: ICD-10-CM

## 2024-02-26 DIAGNOSIS — M79.89 OTHER SPECIFIED SOFT TISSUE DISORDERS: ICD-10-CM

## 2024-02-26 DIAGNOSIS — Z13.29 ENCOUNTER FOR SCREENING FOR OTHER SUSPECTED ENDOCRINE DISORDER: ICD-10-CM

## 2024-02-26 DIAGNOSIS — Z76.89 PERSONS ENCOUNTERING HEALTH SERVICES IN OTHER SPECIFIED CIRCUMSTANCES: Primary | ICD-10-CM

## 2024-02-26 LAB
ALBUMIN SERPL BCP-MCNC: 4 G/DL (ref 3.4–5)
ALP SERPL-CCNC: 181 U/L (ref 33–120)
ALT SERPL W P-5'-P-CCNC: 25 U/L (ref 10–52)
ANION GAP SERPL CALC-SCNC: 15 MMOL/L (ref 10–20)
AST SERPL W P-5'-P-CCNC: 22 U/L (ref 9–39)
BASOPHILS # BLD AUTO: 0.04 X10*3/UL (ref 0–0.1)
BASOPHILS NFR BLD AUTO: 0.6 %
BILIRUB SERPL-MCNC: 0.5 MG/DL (ref 0–1.2)
BUN SERPL-MCNC: 13 MG/DL (ref 6–23)
CALCIUM SERPL-MCNC: 9.4 MG/DL (ref 8.6–10.3)
CHLORIDE SERPL-SCNC: 101 MMOL/L (ref 98–107)
CO2 SERPL-SCNC: 27 MMOL/L (ref 21–32)
CREAT SERPL-MCNC: 0.91 MG/DL (ref 0.5–1.3)
EGFRCR SERPLBLD CKD-EPI 2021: >90 ML/MIN/1.73M*2
EOSINOPHIL # BLD AUTO: 0.02 X10*3/UL (ref 0–0.7)
EOSINOPHIL NFR BLD AUTO: 0.3 %
ERYTHROCYTE [DISTWIDTH] IN BLOOD BY AUTOMATED COUNT: 13.9 % (ref 11.5–14.5)
GLUCOSE SERPL-MCNC: 98 MG/DL (ref 74–99)
HCT VFR BLD AUTO: 47.8 % (ref 41–52)
HGB BLD-MCNC: 15.1 G/DL (ref 13.5–17.5)
IMM GRANULOCYTES # BLD AUTO: 0.03 X10*3/UL (ref 0–0.7)
IMM GRANULOCYTES NFR BLD AUTO: 0.4 % (ref 0–0.9)
LYMPHOCYTES # BLD AUTO: 1.8 X10*3/UL (ref 1.2–4.8)
LYMPHOCYTES NFR BLD AUTO: 26 %
MCH RBC QN AUTO: 32.9 PG (ref 26–34)
MCHC RBC AUTO-ENTMCNC: 31.6 G/DL (ref 32–36)
MCV RBC AUTO: 104 FL (ref 80–100)
MONOCYTES # BLD AUTO: 0.74 X10*3/UL (ref 0.1–1)
MONOCYTES NFR BLD AUTO: 10.7 %
NEUTROPHILS # BLD AUTO: 4.3 X10*3/UL (ref 1.2–7.7)
NEUTROPHILS NFR BLD AUTO: 62 %
NRBC BLD-RTO: 0 /100 WBCS (ref 0–0)
PLATELET # BLD AUTO: 268 X10*3/UL (ref 150–450)
POTASSIUM SERPL-SCNC: 4.2 MMOL/L (ref 3.5–5.3)
PROT SERPL-MCNC: 7.8 G/DL (ref 6.4–8.2)
RBC # BLD AUTO: 4.59 X10*6/UL (ref 4.5–5.9)
SODIUM SERPL-SCNC: 139 MMOL/L (ref 136–145)
TSH SERPL-ACNC: 2.66 MIU/L (ref 0.44–3.98)
URATE SERPL-MCNC: 8.4 MG/DL (ref 4–7.5)
WBC # BLD AUTO: 6.9 X10*3/UL (ref 4.4–11.3)

## 2024-02-26 PROCEDURE — 36415 COLL VENOUS BLD VENIPUNCTURE: CPT

## 2024-02-26 PROCEDURE — 73610 X-RAY EXAM OF ANKLE: CPT | Mod: LEFT SIDE | Performed by: RADIOLOGY

## 2024-02-26 PROCEDURE — 71046 X-RAY EXAM CHEST 2 VIEWS: CPT

## 2024-02-26 PROCEDURE — 85025 COMPLETE CBC W/AUTO DIFF WBC: CPT

## 2024-02-26 PROCEDURE — 84153 ASSAY OF PSA TOTAL: CPT

## 2024-02-26 PROCEDURE — 73130 X-RAY EXAM OF HAND: CPT | Mod: RT

## 2024-02-26 PROCEDURE — 71046 X-RAY EXAM CHEST 2 VIEWS: CPT | Performed by: RADIOLOGY

## 2024-02-26 PROCEDURE — 84550 ASSAY OF BLOOD/URIC ACID: CPT

## 2024-02-26 PROCEDURE — 73130 X-RAY EXAM OF HAND: CPT | Mod: RIGHT SIDE | Performed by: RADIOLOGY

## 2024-02-26 PROCEDURE — 73630 X-RAY EXAM OF FOOT: CPT | Mod: LT

## 2024-02-26 PROCEDURE — 73610 X-RAY EXAM OF ANKLE: CPT | Mod: LT

## 2024-02-26 PROCEDURE — 73630 X-RAY EXAM OF FOOT: CPT | Mod: LEFT SIDE | Performed by: RADIOLOGY

## 2024-02-26 PROCEDURE — 84443 ASSAY THYROID STIM HORMONE: CPT

## 2024-02-26 PROCEDURE — 86431 RHEUMATOID FACTOR QUANT: CPT

## 2024-02-26 PROCEDURE — 80053 COMPREHEN METABOLIC PANEL: CPT

## 2024-02-27 LAB
PSA SERPL-MCNC: 1.73 NG/ML
RHEUMATOID FACT SER NEPH-ACNC: <10 IU/ML (ref 0–15)

## 2024-04-05 ENCOUNTER — TELEPHONE (OUTPATIENT)
Dept: GASTROENTEROLOGY | Facility: CLINIC | Age: 59
End: 2024-04-05
Payer: COMMERCIAL

## 2024-04-08 ENCOUNTER — INFUSION (OUTPATIENT)
Dept: INFUSION THERAPY | Facility: CLINIC | Age: 59
End: 2024-04-08
Payer: COMMERCIAL

## 2024-04-08 VITALS
TEMPERATURE: 97.5 F | DIASTOLIC BLOOD PRESSURE: 80 MMHG | HEART RATE: 71 BPM | WEIGHT: 223.11 LBS | SYSTOLIC BLOOD PRESSURE: 143 MMHG | BODY MASS INDEX: 32.95 KG/M2 | OXYGEN SATURATION: 99 % | RESPIRATION RATE: 17 BRPM

## 2024-04-08 DIAGNOSIS — K51.011 ULCERATIVE PANCOLITIS WITH RECTAL BLEEDING (MULTI): ICD-10-CM

## 2024-04-08 PROCEDURE — 96375 TX/PRO/DX INJ NEW DRUG ADDON: CPT | Performed by: NURSE PRACTITIONER

## 2024-04-08 PROCEDURE — 96413 CHEMO IV INFUSION 1 HR: CPT | Performed by: NURSE PRACTITIONER

## 2024-04-08 RX ORDER — DIPHENHYDRAMINE HYDROCHLORIDE 50 MG/ML
50 INJECTION INTRAMUSCULAR; INTRAVENOUS AS NEEDED
Status: CANCELLED | OUTPATIENT
Start: 2024-06-03

## 2024-04-08 RX ORDER — DIPHENHYDRAMINE HCL 25 MG
25 CAPSULE ORAL ONCE
Status: COMPLETED | OUTPATIENT
Start: 2024-04-08 | End: 2024-04-08

## 2024-04-08 RX ORDER — EPINEPHRINE 0.3 MG/.3ML
0.3 INJECTION SUBCUTANEOUS EVERY 5 MIN PRN
Status: CANCELLED | OUTPATIENT
Start: 2024-06-03

## 2024-04-08 RX ORDER — DIPHENHYDRAMINE HCL 25 MG
25 CAPSULE ORAL ONCE
Status: CANCELLED | OUTPATIENT
Start: 2024-06-03 | End: 2024-06-03

## 2024-04-08 RX ORDER — ACETAMINOPHEN 325 MG/1
650 TABLET ORAL ONCE
Status: COMPLETED | OUTPATIENT
Start: 2024-04-08 | End: 2024-04-08

## 2024-04-08 RX ORDER — FAMOTIDINE 10 MG/ML
20 INJECTION INTRAVENOUS ONCE AS NEEDED
Status: CANCELLED | OUTPATIENT
Start: 2024-06-03

## 2024-04-08 RX ORDER — ACETAMINOPHEN 325 MG/1
650 TABLET ORAL ONCE
Status: CANCELLED | OUTPATIENT
Start: 2024-06-03 | End: 2024-06-03

## 2024-04-08 RX ORDER — ALBUTEROL SULFATE 0.83 MG/ML
3 SOLUTION RESPIRATORY (INHALATION) AS NEEDED
Status: CANCELLED | OUTPATIENT
Start: 2024-06-03

## 2024-04-08 RX ADMIN — Medication 25 MG: at 10:53

## 2024-04-08 RX ADMIN — ACETAMINOPHEN 650 MG: 325 TABLET ORAL at 10:53

## 2024-04-08 ASSESSMENT — ENCOUNTER SYMPTOMS
EXTREMITY WEAKNESS: 0
ROS GI COMMENTS: PT WITH A DX OF IBD REPORTS #  2 FORMED  BM'S PER DAY. DENIES NOTING BLOOD/MUCUS TO STOOLS.  DENIES C/O OF ABDOMINAL PAIN AND/OR BOUTS OF NOCTURNAL STOOLING.
HEADACHES: 0
NAUSEA: 0
FEVER: 0
WHEEZING: 0
CONSTIPATION: 0
NUMBNESS: 0
BLOOD IN STOOL: 0
PALPITATIONS: 0
HEMATURIA: 0
EYE PROBLEMS: 0
DIZZINESS: 0
UNEXPECTED WEIGHT CHANGE: 0
APPETITE CHANGE: 0
MYALGIAS: 0
DYSURIA: 0
DIARRHEA: 0
NERVOUS/ANXIOUS: 0
SORE THROAT: 0
ARTHRALGIAS: 0
CHILLS: 0
FREQUENCY: 0
TROUBLE SWALLOWING: 0
ABDOMINAL DISTENTION: 0
SHORTNESS OF BREATH: 0
LEG SWELLING: 0
COUGH: 0
VOMITING: 0
ABDOMINAL PAIN: 0
LIGHT-HEADEDNESS: 0
FATIGUE: 0
WOUND: 0
VOICE CHANGE: 0
DEPRESSION: 0

## 2024-04-08 NOTE — PROGRESS NOTES
[facility] [place]   infusion Clinic Note   Date: 2024   Name: Raleigh Owens  : 1965   MRN: [id number]         Reason for Visit: Follow-up and OP Infusion (PT HERE FOR INFLECTRA 900MG/NEXT APPT: 8 WEEKS )         Visit Type: INFUSION       Ordered By: DR ALANIS      Accompanied by:Self      Diagnosis: Ulcerative pancolitis with rectal bleeding (CMS/HCC)       Allergies:   Allergies as of 2024    (No Known Allergies)         Current Medications has a current medication list which includes the following prescription(s): acetaminophen, calcium carbonate-vitamin d3, cholecalciferol, epinephrine hcl (pf), esomeprazole, folic acid, ibuprofen, infliximab-dyyb, essential man, sulfasalazine, valacyclovir, and sodium chloride.       Vitals:   Vitals:    24 1045 24 1120 24 1233   BP: 171/81  Comment: nurse notified 153/70  Comment: nurse notified 143/80   Pulse: 105  Comment: nurse notified 85 71   Resp: 18 18 17   Temp: 36.4 °C (97.5 °F) 36.4 °C (97.6 °F) 36.4 °C (97.5 °F)   SpO2: 93% 95% 99%   Weight: 101 kg (223 lb 1.7 oz)               Infusion Pre-procedure Checklist:   - Allergies reviewed: yes   - Medications reviewed: yes       - Previous reaction to current treatment: no      Assess patient for the concerns below. Document provider notification as appropriate.  - Active or recent infection with/without current antibiotic use: no  - Recent or planned invasive dental work: no  - Recent or planned surgeries: no  - Recently received or plans to receive vaccinations: no  - Has treatment related toxicities: no  - Is pregnant:  n/a      Pain: 0   - Is the pain different from normal: n/a   - Is the pain tolerable: n/a   - Is your Doctor aware:  n/a      Labs: N/A         Fall Risk Screening: Deepika Fall Risk  History of Falling, Immediate or Within 3 Months: No  Secondary Diagnosis: No  Ambulatory Aid: Walks without aid/bedrest/nurse assist  Intravenous Therapy/Heparin  Lock: No  Gait/Transferring: Normal/bedrest/immobile  Mental Status: Oriented to own ability  Poe Fall Risk Score: 0       Review Of Systems:  Review of Systems   Constitutional:  Negative for appetite change, chills, fatigue, fever and unexpected weight change.   HENT:   Negative for hearing loss, mouth sores, sore throat, tinnitus, trouble swallowing and voice change.    Eyes:  Negative for eye problems.   Respiratory:  Negative for cough, shortness of breath and wheezing.    Cardiovascular:  Negative for chest pain, leg swelling and palpitations.   Gastrointestinal:  Negative for abdominal distention, abdominal pain, blood in stool, constipation, diarrhea, nausea and vomiting.        PT WITH A DX OF IBD REPORTS #  2 FORMED  BM'S PER DAY. denies NOTING BLOOD/MUCUS TO STOOLS.  denies C/O OF ABDOMINAL PAIN AND/OR BOUTS OF NOCTURNAL STOOLING.     Genitourinary:  Negative for dysuria, frequency and hematuria.    Musculoskeletal:  Negative for arthralgias and myalgias.   Skin:  Negative for itching, rash and wound.   Neurological:  Negative for dizziness, extremity weakness, headaches, light-headedness and numbness.   Psychiatric/Behavioral:  Negative for depression. The patient is not nervous/anxious.          Infusion Readiness:   - Assessment Concerns Related to Infusion: No  - Provider notified: n/a      Document Below Only If Indicated:   New Patient Education:    N/A (returning patient for continuation of therapy. Ongoing education provided as needed.)        Treatment Conditions & Drug Specific Questions:    InFLIXimab  (INFLECTRA)    (Unless otherwise specified on patient specific therapy plan):     TREATMENT CONDITIONS:  Unless otherwise specified on patient specific therapy plan HOLD and notify Provider prior to proceeding with today's infusion if patient with:  o Positive T-Spot  o Reactive Hep B sAg and/or Hep B Core Antibody    Lab Results   Component Value Date    TBSIN Negative 02/15/2023    QFG  NEGATIVE 01/25/2018     HEP BSAG HISTORICALLY NEGATIVE     Lab Results   Component Value Date    HEPCAB NONREACTIVE 05/10/2022        Labs reviewed and patient meets treatment conditions? Yes    REMINDER:   WEIGHT BASED DRUG     Recommended Vitals/Observation:  Vitals:     Induction: Obtain vital signs every 30 minutes; at end of observation period and as needed.     Maintenance: Obtain vital signs at start and end of infusions  Observation:     Induction: Patient is monitored for 30 minutes post-infusion     Maintenance: No observation.        Weight Based Drug Calculations:    WEIGHT BASED DRUGS: Infliximab (REMICADE, INFLECTRA, RENFLEXIS)   Patient's dosing weight (kg): 98     10% weight variance for prescribed treatment: 88.2 kg to 107.8 kg     Patient's weight today:   Vitals:    04/08/24 1045   Weight: 101 kg (223 lb 1.7 oz)         weight range for prescribed dose:     Patient weight today falls outside of 10% variance or  weight range: No     Home Care pharmacist informed of weight variance: Not applicable    Doses that are weight based have an acceptable variance rule within 10% of the prescribed   order and/or within  weight range. If patient weight on day of infusion falls   outside of the 10% variance, or weight range, infusion is administered and   pharmacy contacted regarding future dosing adjustments, per policy.         Initiated By: Damien Mendez RN   Time: 1:07 PM     We administered acetaminophen, methylPREDNISolone sod succinate, diphenhydrAMINE, and inFLIXimab-dyyb (Inflectra) 900 mg in sodium chloride 0.9% 250 mL IV*.

## 2024-04-08 NOTE — PATIENT INSTRUCTIONS
Today :We administered acetaminophen, methylPREDNISolone sod succinate, diphenhydrAMINE, and inFLIXimab-dyyb (Inflectra) 900 mg in sodium chloride 0.9% 250 mL IV*.     For:   1. Ulcerative pancolitis with rectal bleeding (CMS/HCC)         Your next appointment is due in:  8 WEEKS         Please read the  Medication Guide that was given to you and reviewed during todays visit.     (Tell all doctors including dentists that you are taking this medication)     Go to the emergency room or call 911 if:  -You have signs of allergic reaction:   -Rash, hives, itching.   -Swollen, blistered, peeling skin.   -Swelling of face, lips, mouth, tongue or throat.   -Tightness of chest, trouble breathing, swallowing or talking     Call your doctor:  - If IV / injection site gets red, warm, swollen, itchy or leaks fluid or pus.     (Leave dressing on your IV site for at least 2 hours and keep area clean and dry  - If you get sick or have symptoms of infection or are not feeling well for any reason.    (Wash your hands often, stay away from people who are sick)  - If you have side effects from your medication that do not go away or are bothersome.     (Refer to the teaching your nurse gave you for side effects to call your doctor about)    - Common side effects may include:  stuffy nose, headache, feeling tired, muscle aches, upset stomach  - Before receiving any vaccines     - Call [facility] at   If:  - You get sick, are on antibiotics, have had a recent vaccine, have surgery or dental work and your doctor wants your visit rescheduled.  - You need to cancel and reschedule your visit for any reason. Call at least 2 days before your visit if you need to cancel.   - Your insurance changes before your next visit.    (We will need to get approval from your new insurance. This can take up to two weeks.)     The [facility] is opened Monday thru Friday. We are closed on weekends and holidays.   Voice  mail will take your call if the center is closed. If you leave a message please allow 24 hours for a call back during weekdays. If you leave a message on a weekend/holiday, we will call you back the next business day.

## 2024-04-11 ENCOUNTER — TELEPHONE (OUTPATIENT)
Dept: GASTROENTEROLOGY | Facility: HOSPITAL | Age: 59
End: 2024-04-11
Payer: COMMERCIAL

## 2024-04-11 DIAGNOSIS — K51.00 ULCERATIVE PANCOLITIS WITHOUT COMPLICATION (MULTI): ICD-10-CM

## 2024-04-11 RX ORDER — FOLIC ACID 1 MG/1
TABLET ORAL
Qty: 24 TABLET | Refills: 2 | Status: SHIPPED | OUTPATIENT
Start: 2024-04-11 | End: 2024-05-30 | Stop reason: SDUPTHER

## 2024-04-11 RX ORDER — SULFASALAZINE 500 MG/1
1500 TABLET ORAL 2 TIMES DAILY
Qty: 90 TABLET | Refills: 2 | Status: SHIPPED | OUTPATIENT
Start: 2024-04-11 | End: 2024-05-30 | Stop reason: SDUPTHER

## 2024-05-30 ENCOUNTER — TELEPHONE (OUTPATIENT)
Dept: GASTROENTEROLOGY | Facility: HOSPITAL | Age: 59
End: 2024-05-30
Payer: COMMERCIAL

## 2024-05-30 DIAGNOSIS — K51.00 ULCERATIVE PANCOLITIS WITHOUT COMPLICATION (MULTI): ICD-10-CM

## 2024-05-30 RX ORDER — SULFASALAZINE 500 MG/1
1500 TABLET ORAL 2 TIMES DAILY
Qty: 545 TABLET | Refills: 2 | Status: SHIPPED | OUTPATIENT
Start: 2024-05-30

## 2024-05-30 RX ORDER — FOLIC ACID 1 MG/1
TABLET ORAL
Qty: 90 TABLET | Refills: 2 | Status: SHIPPED | OUTPATIENT
Start: 2024-05-30

## 2024-06-03 ENCOUNTER — INFUSION (OUTPATIENT)
Dept: INFUSION THERAPY | Facility: CLINIC | Age: 59
End: 2024-06-03
Payer: COMMERCIAL

## 2024-06-03 VITALS
BODY MASS INDEX: 31.86 KG/M2 | TEMPERATURE: 97.6 F | RESPIRATION RATE: 16 BRPM | HEART RATE: 76 BPM | WEIGHT: 215.72 LBS | SYSTOLIC BLOOD PRESSURE: 155 MMHG | OXYGEN SATURATION: 94 % | DIASTOLIC BLOOD PRESSURE: 93 MMHG

## 2024-06-03 DIAGNOSIS — K51.011 ULCERATIVE PANCOLITIS WITH RECTAL BLEEDING (MULTI): ICD-10-CM

## 2024-06-03 PROCEDURE — 87340 HEPATITIS B SURFACE AG IA: CPT

## 2024-06-03 PROCEDURE — 96413 CHEMO IV INFUSION 1 HR: CPT | Performed by: NURSE PRACTITIONER

## 2024-06-03 PROCEDURE — 36415 COLL VENOUS BLD VENIPUNCTURE: CPT

## 2024-06-03 PROCEDURE — 96375 TX/PRO/DX INJ NEW DRUG ADDON: CPT | Performed by: NURSE PRACTITIONER

## 2024-06-03 RX ORDER — ACETAMINOPHEN 325 MG/1
650 TABLET ORAL ONCE
OUTPATIENT
Start: 2024-07-29 | End: 2024-07-29

## 2024-06-03 RX ORDER — ACETAMINOPHEN 325 MG/1
650 TABLET ORAL ONCE
Status: COMPLETED | OUTPATIENT
Start: 2024-06-03 | End: 2024-06-03

## 2024-06-03 RX ORDER — FAMOTIDINE 10 MG/ML
20 INJECTION INTRAVENOUS ONCE AS NEEDED
OUTPATIENT
Start: 2024-07-29

## 2024-06-03 RX ORDER — DIPHENHYDRAMINE HCL 25 MG
25 CAPSULE ORAL ONCE
OUTPATIENT
Start: 2024-07-29 | End: 2024-07-29

## 2024-06-03 RX ORDER — DIPHENHYDRAMINE HYDROCHLORIDE 50 MG/ML
50 INJECTION INTRAMUSCULAR; INTRAVENOUS AS NEEDED
OUTPATIENT
Start: 2024-07-29

## 2024-06-03 RX ORDER — DIPHENHYDRAMINE HCL 25 MG
25 CAPSULE ORAL ONCE
Status: COMPLETED | OUTPATIENT
Start: 2024-06-03 | End: 2024-06-03

## 2024-06-03 RX ORDER — ALBUTEROL SULFATE 0.83 MG/ML
3 SOLUTION RESPIRATORY (INHALATION) AS NEEDED
OUTPATIENT
Start: 2024-07-29

## 2024-06-03 RX ORDER — EPINEPHRINE 0.3 MG/.3ML
0.3 INJECTION SUBCUTANEOUS EVERY 5 MIN PRN
OUTPATIENT
Start: 2024-07-29

## 2024-06-03 RX ADMIN — Medication 25 MG: at 13:30

## 2024-06-03 RX ADMIN — ACETAMINOPHEN 650 MG: 325 TABLET ORAL at 13:30

## 2024-06-03 ASSESSMENT — ENCOUNTER SYMPTOMS
PALPITATIONS: 0
LEG SWELLING: 0
NAUSEA: 0
DYSURIA: 0
DIZZINESS: 0
HEADACHES: 0
FATIGUE: 0
DIFFICULTY URINATING: 0
NERVOUS/ANXIOUS: 0
TROUBLE SWALLOWING: 0
NUMBNESS: 0
SHORTNESS OF BREATH: 0
CHEST TIGHTNESS: 0
FREQUENCY: 0
SORE THROAT: 0
APPETITE CHANGE: 0
EXTREMITY WEAKNESS: 0
WHEEZING: 0
UNEXPECTED WEIGHT CHANGE: 0
ABDOMINAL DISTENTION: 0
CONFUSION: 0
WOUND: 0
CONSTIPATION: 0
HEMATURIA: 0
VOICE CHANGE: 0
DEPRESSION: 0
ARTHRALGIAS: 0
LIGHT-HEADEDNESS: 0
SLEEP DISTURBANCE: 0
BLOOD IN STOOL: 0
ABDOMINAL PAIN: 0
EYE PROBLEMS: 0
DIARRHEA: 0
FEVER: 0
COUGH: 0
CHILLS: 0
MYALGIAS: 0
VOMITING: 0

## 2024-06-03 NOTE — PATIENT INSTRUCTIONS
Today :We administered acetaminophen, methylPREDNISolone sod succinate, diphenhydrAMINE, and inFLIXimab-dyyb (Inflectra) 900 mg in sodium chloride 0.9% 250 mL IV.     For:   1. Ulcerative pancolitis with rectal bleeding (Multi)         Your next appointment is due in:  56 DAYS         Please read the  Medication Guide that was given to you and reviewed during todays visit.     (Tell all doctors including dentists that you are taking this medication)     Go to the emergency room or call 911 if:  -You have signs of allergic reaction:   -Rash, hives, itching.   -Swollen, blistered, peeling skin.   -Swelling of face, lips, mouth, tongue or throat.   -Tightness of chest, trouble breathing, swallowing or talking     Call your doctor:  - If IV / injection site gets red, warm, swollen, itchy or leaks fluid or pus.     (Leave dressing on your IV site for at least 2 hours and keep area clean and dry  - If you get sick or have symptoms of infection or are not feeling well for any reason.    (Wash your hands often, stay away from people who are sick)  - If you have side effects from your medication that do not go away or are bothersome.     (Refer to the teaching your nurse gave you for side effects to call your doctor about)    - Common side effects may include:  stuffy nose, headache, feeling tired, muscle aches, upset stomach  - Before receiving any vaccines     - Call the [facility] at   If:  - You get sick, are on antibiotics, have had a recent vaccine, have surgery or dental work and your doctor wants your visit rescheduled.  - You need to cancel and reschedule your visit for any reason. Call at least 2 days before your visit if you need to cancel.   - Your insurance changes before your next visit.    (We will need to get approval from your new insurance. This can take up to two weeks.)     [facility] is opened Monday thru Friday. We are closed on weekends and holidays.   Voice mail  will take your call if the center is closed. If you leave a message please allow 24 hours for a call back during weekdays. If you leave a message on a weekend/holiday, we will call you back the next business day.

## 2024-06-03 NOTE — PROGRESS NOTES
[facility]   infusion Clinic Note   Date: Maria Del Carmen 3, 2024   Name: Raleigh Owens  : 1965   MRN: [id number]         Reason for Visit: OP Infusion (PT HERE FOR Q56 DAY INFLECTRA 900 MG INFUSION)         Visit Type: INFUSION       Ordered By: DR ALANIS      Accompanied by:Self      Diagnosis: Ulcerative pancolitis with rectal bleeding (Multi)       Allergies:   Allergies as of 2024    (No Known Allergies)         Current Medications has a current medication list which includes the following prescription(s): acetaminophen, calcium carbonate-vitamin d3, cholecalciferol, esomeprazole, folic acid, ibuprofen, infliximab-dyyb, essential man, sulfasalazine, and valacyclovir.       Vitals:   Vitals:    24 1300 24 1400 24 1500   BP: (!) 156/99  Comment: nurse notified 157/90 (!) 155/93  Comment: nurse notified   Pulse: 93 82 76   Resp: 16 16 16   Temp: 36.6 °C (97.9 °F) 36.7 °C (98.1 °F) 36.4 °C (97.6 °F)   SpO2: 92% 92% 94%   Weight: 97.8 kg (215 lb 11.5 oz)                 Infusion Pre-procedure Checklist:   - Allergies reviewed: yes   - Medications reviewed: yes       - Previous reaction to current treatment: no      Assess patient for the concerns below. Document provider notification as appropriate.  - Active or recent infection with/without current antibiotic use: no  - Recent or planned invasive dental work: no  - Recent or planned surgeries: no  - Recently received or plans to receive vaccinations: no  - Has treatment related toxicities: no  - Is pregnant:  n/a      Pain: 0   - Is the pain different from normal: n/a   - Is the pain tolerable: n/a   - Is your Doctor aware:  n/a      Labs: N/A         Fall Risk Screening: Deepika Fall Risk  History of Falling, Immediate or Within 3 Months: No  Secondary Diagnosis: No  Ambulatory Aid: Walks without aid/bedrest/nurse assist  Intravenous Therapy/Heparin Lock: Yes  Gait/Transferring: Normal/bedrest/immobile  Mental Status:  Oriented to own ability  Poe Fall Risk Score: 20       Review Of Systems:  Review of Systems   Constitutional:  Negative for appetite change, chills, fatigue, fever and unexpected weight change.   HENT:   Negative for hearing loss, mouth sores, nosebleeds, sore throat, tinnitus, trouble swallowing and voice change.    Eyes:  Negative for eye problems.   Respiratory:  Negative for chest tightness, cough, shortness of breath and wheezing.    Cardiovascular:  Negative for chest pain, leg swelling and palpitations.   Gastrointestinal:  Negative for abdominal distention, abdominal pain, blood in stool, constipation, diarrhea, nausea and vomiting.        PT WITH A DX OF IBD REPORTS # 2 FORMED BM'S PER DAY. denies NOTING BLOOD/MUCUS TO STOOLS.  denies C/O OF ABDOMINAL PAIN AND/OR BOUTS OF NOCTURNAL STOOLING.    Genitourinary:  Negative for difficulty urinating, dysuria, frequency and hematuria.    Musculoskeletal:  Negative for arthralgias, gait problem and myalgias.   Skin:  Negative for itching, rash and wound.   Neurological:  Negative for dizziness, extremity weakness, gait problem, headaches, light-headedness and numbness.   Psychiatric/Behavioral:  Negative for confusion, depression, sleep disturbance and suicidal ideas. The patient is not nervous/anxious.          Infusion Readiness:   - Assessment Concerns Related to Infusion: No  - Provider notified: n/a      Document Below Only If Indicated:   New Patient Education:    N/A (returning patient for continuation of therapy. Ongoing education provided as needed.)        Treatment Conditions & Drug Specific Questions:    InFLIXimab  (INFLECTRA)    (Unless otherwise specified on patient specific therapy plan):     TREATMENT CONDITIONS:  Unless otherwise specified on patient specific therapy plan HOLD and notify Provider prior to proceeding with today's infusion if patient with:  o Positive T-Spot  o Reactive Hep B sAg and/or Hep B Core Antibody    Lab Results    Component Value Date    TBSIN Negative 02/15/2023    QFG NEGATIVE 01/25/2018     HEP BSAG HISTORICALLY NEGATIVE     Lab Results   Component Value Date    HEPCAB NONREACTIVE 05/10/2022        Labs reviewed and patient meets treatment conditions? Yes    REMINDER:   WEIGHT BASED DRUG     Recommended Vitals/Observation:  Vitals:     Induction: Obtain vital signs every 30 minutes; at end of observation period and as needed.     Maintenance: Obtain vital signs at start and end of infusions  Observation:     Induction: Patient is monitored for 30 minutes post-infusion     Maintenance: No observation.        Weight Based Drug Calculations:    WEIGHT BASED DRUGS: Infliximab (REMICADE, INFLECTRA, RENFLEXIS)   Patient's dosing weight (kg): 98     10% weight variance for prescribed treatment: 88.2 kg to 107.8 kg     Patient's weight today:   Vitals:    06/03/24 1300   Weight: 97.8 kg (215 lb 11.5 oz)           weight range for prescribed dose:     Patient weight today falls outside of 10% variance or  weight range: No    [facility] pharmacist informed of weight variance: Not applicable    Doses that are weight based have an acceptable variance rule within 10% of the prescribed   order and/or within  weight range. If patient weight on day of infusion falls   outside of the 10% variance, or weight range, infusion is administered and   pharmacy contacted regarding future dosing adjustments, per policy.         Initiated By: Faye Aldana RN   Time: 3:34 PM     We administered acetaminophen, methylPREDNISolone sod succinate, diphenhydrAMINE, and inFLIXimab-dyyb (Inflectra) 900 mg in sodium chloride 0.9% 250 mL IV.

## 2024-06-04 LAB — HBV SURFACE AG SERPL QL IA: NONREACTIVE

## 2024-07-29 ENCOUNTER — APPOINTMENT (OUTPATIENT)
Dept: INFUSION THERAPY | Facility: CLINIC | Age: 59
End: 2024-07-29
Payer: COMMERCIAL

## 2024-07-29 VITALS
DIASTOLIC BLOOD PRESSURE: 87 MMHG | SYSTOLIC BLOOD PRESSURE: 139 MMHG | OXYGEN SATURATION: 97 % | BODY MASS INDEX: 31.53 KG/M2 | TEMPERATURE: 97.3 F | WEIGHT: 213.52 LBS | HEART RATE: 81 BPM | RESPIRATION RATE: 18 BRPM

## 2024-07-29 DIAGNOSIS — K51.011 ULCERATIVE PANCOLITIS WITH RECTAL BLEEDING (MULTI): ICD-10-CM

## 2024-07-29 PROCEDURE — 96413 CHEMO IV INFUSION 1 HR: CPT | Performed by: NURSE PRACTITIONER

## 2024-07-29 PROCEDURE — 96375 TX/PRO/DX INJ NEW DRUG ADDON: CPT | Performed by: NURSE PRACTITIONER

## 2024-07-29 RX ORDER — DIPHENHYDRAMINE HCL 25 MG
25 CAPSULE ORAL ONCE
Status: COMPLETED | OUTPATIENT
Start: 2024-07-29 | End: 2024-07-29

## 2024-07-29 RX ORDER — ACETAMINOPHEN 325 MG/1
650 TABLET ORAL ONCE
Status: COMPLETED | OUTPATIENT
Start: 2024-07-29 | End: 2024-07-29

## 2024-07-29 RX ORDER — DIPHENHYDRAMINE HYDROCHLORIDE 50 MG/ML
50 INJECTION INTRAMUSCULAR; INTRAVENOUS AS NEEDED
OUTPATIENT
Start: 2024-09-23

## 2024-07-29 RX ORDER — EPINEPHRINE 0.3 MG/.3ML
0.3 INJECTION SUBCUTANEOUS EVERY 5 MIN PRN
OUTPATIENT
Start: 2024-09-23

## 2024-07-29 RX ORDER — ACETAMINOPHEN 325 MG/1
650 TABLET ORAL ONCE
OUTPATIENT
Start: 2024-09-23 | End: 2024-09-23

## 2024-07-29 RX ORDER — FAMOTIDINE 10 MG/ML
20 INJECTION INTRAVENOUS ONCE AS NEEDED
OUTPATIENT
Start: 2024-09-23

## 2024-07-29 RX ORDER — ALBUTEROL SULFATE 0.83 MG/ML
3 SOLUTION RESPIRATORY (INHALATION) AS NEEDED
OUTPATIENT
Start: 2024-09-23

## 2024-07-29 RX ORDER — DIPHENHYDRAMINE HCL 25 MG
25 CAPSULE ORAL ONCE
OUTPATIENT
Start: 2024-09-23 | End: 2024-09-23

## 2024-07-29 ASSESSMENT — ENCOUNTER SYMPTOMS
HEMATURIA: 0
VOICE CHANGE: 0
PALPITATIONS: 0
LIGHT-HEADEDNESS: 0
TROUBLE SWALLOWING: 0
SORE THROAT: 0
EYE PROBLEMS: 0
DIZZINESS: 0
EXTREMITY WEAKNESS: 0
HEADACHES: 0
DIARRHEA: 0
BLOOD IN STOOL: 0
CONSTIPATION: 0
FEVER: 0
MYALGIAS: 0
VOMITING: 0
UNEXPECTED WEIGHT CHANGE: 0
WOUND: 0
LEG SWELLING: 0
NERVOUS/ANXIOUS: 0
NAUSEA: 0
FATIGUE: 0
WHEEZING: 0
SHORTNESS OF BREATH: 0
ABDOMINAL PAIN: 0
COUGH: 0
FREQUENCY: 0
DYSURIA: 0
NUMBNESS: 0
ARTHRALGIAS: 0
CHILLS: 0
APPETITE CHANGE: 0
DEPRESSION: 0

## 2024-07-29 NOTE — PATIENT INSTRUCTIONS
Today :We administered acetaminophen, methylPREDNISolone sod succinate, diphenhydrAMINE, and inFLIXimab-dyyb (Inflectra) 900 mg in sodium chloride 0.9% 250 mL IV.     For:   1. Ulcerative pancolitis with rectal bleeding (Multi)         Your next appointment is due in:  8 weeks     Please read the  Medication Guide that was given to you and reviewed during todays visit.     (Tell all doctors including dentists that you are taking this medication)     Go to the emergency room or call 911 if:  -You have signs of allergic reaction:   -Rash, hives, itching.   -Swollen, blistered, peeling skin.   -Swelling of face, lips, mouth, tongue or throat.   -Tightness of chest, trouble breathing, swallowing or talking     Call your doctor:  - If IV / injection site gets red, warm, swollen, itchy or leaks fluid or pus.     (Leave dressing on your IV site for at least 2 hours and keep area clean and dry  - If you get sick or have symptoms of infection or are not feeling well for any reason.    (Wash your hands often, stay away from people who are sick)  - If you have side effects from your medication that do not go away or are bothersome.     (Refer to the teaching your nurse gave you for side effects to call your doctor about)    - Common side effects may include:  stuffy nose, headache, feeling tired, muscle aches, upset stomach  - Before receiving any vaccines     - Call [facility] at   If:  - You get sick, are on antibiotics, have had a recent vaccine, have surgery or dental work and your doctor wants your visit rescheduled.  - You need to cancel and reschedule your visit for any reason. Call at least 2 days before your visit if you need to cancel.   - Your insurance changes before your next visit.    (We will need to get approval from your new insurance. This can take up to two weeks.)     [facility] is opened Monday thru Friday. We are closed on weekends and holidays.   Voice mail will  take your call if the center is closed. If you leave a message please allow 24 hours for a call back during weekdays. If you leave a message on a weekend/holiday, we will call you back the next business day.

## 2024-07-29 NOTE — PROGRESS NOTES
[facility]   Infusion Clinic Note   Date: 2024   Name: Raleigh Owens  : 1965   MRN: [id number]         Reason for Visit: Follow-up and OP Infusion (PATIENT IS HERE FOR INFLECTRA 900 MG INFUSION EVERY 8 WEEKS.)         Today: We administered acetaminophen, methylPREDNISolone sod succinate, diphenhydrAMINE, and inFLIXimab-dyyb (Inflectra) 900 mg in sodium chloride 0.9% 250 mL IV.       Visit Type: INFUSION       Ordered By: DR. ALANIS      Accompanied by:Self      Diagnosis: Ulcerative pancolitis with rectal bleeding (Multi)       Allergies:   Allergies as of 2024    (No Known Allergies)         Current Medications has a current medication list which includes the following prescription(s): acetaminophen, calcium carbonate-vitamin d3, cholecalciferol, esomeprazole, folic acid, ibuprofen, infliximab-dyyb, essential man, sulfasalazine, and valacyclovir.       Vitals:   Vitals:    24 1030 24 1230   BP: 158/88  Comment: nurse notified 139/87   Pulse: 96 81   Resp: 18 18   Temp: 36.5 °C (97.7 °F) 36.3 °C (97.3 °F)   SpO2: 97% 97%   Weight: 96.9 kg (213 lb 8.3 oz)              Infusion Pre-procedure Checklist:   - Allergies reviewed: yes   - Medications reviewed: yes       - Previous reaction to current treatment: no      Assess patient for the concerns below. Document provider notification as appropriate.  - Active or recent infection with/without current antibiotic use: no  - Recent or planned invasive dental work: no  - Recent or planned surgeries: no  - Recently received or plans to receive vaccinations: no  - Has treatment related toxicities: no  - Is pregnant:  n/a      Pain: 0   - Is the pain different from normal: n/a   - Is the pain tolerable: n/a   - Is your Doctor aware:  n/a      Labs: N/A         Fall Risk Screening: Deepika Fall Risk  History of Falling, Immediate or Within 3 Months: No  Secondary Diagnosis: No  Ambulatory Aid: Walks without  aid/bedrest/nurse assist  Intravenous Therapy/Heparin Lock: Yes  Gait/Transferring: Normal/bedrest/immobile  Mental Status: Oriented to own ability  Poe Fall Risk Score: 20       Review Of Systems:  Review of Systems   Constitutional:  Negative for appetite change, chills, fatigue, fever and unexpected weight change.   HENT:   Negative for hearing loss, mouth sores, sore throat, tinnitus, trouble swallowing and voice change.    Eyes:  Negative for eye problems.   Respiratory:  Negative for cough, shortness of breath and wheezing.    Cardiovascular:  Negative for chest pain, leg swelling and palpitations.   Gastrointestinal:  Negative for abdominal pain, blood in stool, constipation, diarrhea, nausea and vomiting.        PT WITH A DX OF IBD REPORTS # 2-3 FORMED  BM'S PER DAY. denies NOTING BLOOD/MUCUS TO STOOLS.  denies C/O OF ABDOMINAL PAIN AND/OR BOUTS OF NOCTURNAL STOOLING.      Genitourinary:  Negative for dysuria, frequency and hematuria.    Musculoskeletal:  Negative for arthralgias and myalgias.   Skin:  Negative for itching, rash and wound.   Neurological:  Negative for dizziness, extremity weakness, headaches, light-headedness and numbness.   Psychiatric/Behavioral:  Negative for depression. The patient is not nervous/anxious.          Infusion Readiness:   - Assessment Concerns Related to Infusion: No  - Provider notified: n/a      Document Below Only If Indicated:   New Patient Education:    N/A (returning patient for continuation of therapy. Ongoing education provided as needed.)        Treatment Conditions & Drug Specific Questions:    InFLIXimab  (AVSOLA, INFLECTRA, REMICADE, RENFLEXIS)    (Unless otherwise specified on patient specific therapy plan):     TREATMENT CONDITIONS:  Unless otherwise specified on patient specific therapy plan HOLD and notify Provider prior to proceeding with today's infusion if patient with:  o Positive T-Spot  o Reactive Hep B sAg and/or Hep B Core Antibody    Lab Results  "  Component Value Date    TBSIN Negative 02/15/2023    QFG NEGATIVE 01/25/2018      Lab Results   Component Value Date    HEPBSAG Nonreactive 06/03/2024      No results found for: \"NONUHFIRE\", \"NONUHSWGH\", \"NONUHFISH\", \"EXTHEPBSAG\"  No results found for: \"HBCTI\", \"HEPBCAB\"  Lab Results   Component Value Date    HEPCAB NONREACTIVE 05/10/2022        Labs reviewed and patient meets treatment conditions? Yes    DRUG SPECIFIC QUESTIONS:    Any new or worsening signs / symptoms of heart failure which may include things like worsening shortness of breath, swelling, fatigue? No   (If yes notify provider before proceeding with today's infusion. New onset or worsening HF have been reported with infliximab)    REMINDER:   WEIGHT BASED DRUG     Recommended Vitals/Observation:  Vitals:     Induction: Obtain vital signs every 30 minutes; at end of observation period and as needed.     Maintenance: Obtain vital signs at start and end of infusions  Observation:     Induction: Patient is monitored for 30 minutes post-infusion     Maintenance: No observation.        Weight Based Drug Calculations:    WEIGHT BASED DRUGS: Infliximab (REMICADE, INFLECTRA, RENFLEXIS)   Patient's dosing weight (kg): 98 KG    10% weight variance for prescribed treatment: 88.2 kg to 107.8 kg     Patient's weight today:   Vitals:    07/29/24 1030   Weight: 96.9 kg (213 lb 8.3 oz)       Patient weight today falls outside of 10% variance or  weight range: No     Home Care pharmacist informed of weight variance: Not applicable    Doses that are weight based have an acceptable variance rule within 10% of the prescribed   order and/or within  weight range. If patient weight on day of infusion falls   outside of the 10% variance, or weight range, infusion is administered and   pharmacy contacted regarding future dosing adjustments, per policy.         Initiated By: Louise Lopez RN        "

## 2024-09-12 DIAGNOSIS — K51.919 ULCERATIVE COLITIS WITH COMPLICATION, UNSPECIFIED LOCATION (MULTI): ICD-10-CM

## 2024-09-23 ENCOUNTER — APPOINTMENT (OUTPATIENT)
Dept: INFUSION THERAPY | Facility: CLINIC | Age: 59
End: 2024-09-23
Payer: COMMERCIAL

## 2024-10-07 ENCOUNTER — APPOINTMENT (OUTPATIENT)
Dept: INFUSION THERAPY | Facility: CLINIC | Age: 59
End: 2024-10-07
Payer: COMMERCIAL

## 2024-10-07 VITALS
RESPIRATION RATE: 16 BRPM | DIASTOLIC BLOOD PRESSURE: 88 MMHG | BODY MASS INDEX: 32.59 KG/M2 | HEART RATE: 79 BPM | TEMPERATURE: 97.6 F | SYSTOLIC BLOOD PRESSURE: 135 MMHG | WEIGHT: 220.68 LBS | OXYGEN SATURATION: 95 %

## 2024-10-07 DIAGNOSIS — K51.011 ULCERATIVE PANCOLITIS WITH RECTAL BLEEDING (MULTI): Primary | ICD-10-CM

## 2024-10-07 PROCEDURE — 96375 TX/PRO/DX INJ NEW DRUG ADDON: CPT | Performed by: NURSE PRACTITIONER

## 2024-10-07 PROCEDURE — 96413 CHEMO IV INFUSION 1 HR: CPT | Performed by: NURSE PRACTITIONER

## 2024-10-07 RX ORDER — FAMOTIDINE 10 MG/ML
20 INJECTION INTRAVENOUS ONCE AS NEEDED
Status: DISCONTINUED | OUTPATIENT
Start: 2024-10-07 | End: 2024-10-07 | Stop reason: HOSPADM

## 2024-10-07 RX ORDER — ALBUTEROL SULFATE 0.83 MG/ML
3 SOLUTION RESPIRATORY (INHALATION) AS NEEDED
Status: DISCONTINUED | OUTPATIENT
Start: 2024-10-07 | End: 2024-10-07 | Stop reason: HOSPADM

## 2024-10-07 RX ORDER — EPINEPHRINE 0.3 MG/.3ML
0.3 INJECTION SUBCUTANEOUS EVERY 5 MIN PRN
Status: DISCONTINUED | OUTPATIENT
Start: 2024-10-07 | End: 2024-10-07 | Stop reason: HOSPADM

## 2024-10-07 RX ORDER — DIPHENHYDRAMINE HCL 25 MG
25 CAPSULE ORAL ONCE
OUTPATIENT
Start: 2024-11-18 | End: 2024-11-18

## 2024-10-07 RX ORDER — ACETAMINOPHEN 325 MG/1
650 TABLET ORAL ONCE
Status: COMPLETED | OUTPATIENT
Start: 2024-10-07 | End: 2024-10-07

## 2024-10-07 RX ORDER — FAMOTIDINE 10 MG/ML
20 INJECTION INTRAVENOUS ONCE AS NEEDED
OUTPATIENT
Start: 2024-11-18

## 2024-10-07 RX ORDER — DIPHENHYDRAMINE HYDROCHLORIDE 50 MG/ML
50 INJECTION INTRAMUSCULAR; INTRAVENOUS AS NEEDED
OUTPATIENT
Start: 2024-11-18

## 2024-10-07 RX ORDER — ALBUTEROL SULFATE 0.83 MG/ML
3 SOLUTION RESPIRATORY (INHALATION) AS NEEDED
OUTPATIENT
Start: 2024-11-18

## 2024-10-07 RX ORDER — EPINEPHRINE 0.3 MG/.3ML
0.3 INJECTION SUBCUTANEOUS EVERY 5 MIN PRN
OUTPATIENT
Start: 2024-11-18

## 2024-10-07 RX ORDER — DIPHENHYDRAMINE HCL 25 MG
25 CAPSULE ORAL ONCE
Status: COMPLETED | OUTPATIENT
Start: 2024-10-07 | End: 2024-10-07

## 2024-10-07 RX ORDER — DIPHENHYDRAMINE HYDROCHLORIDE 50 MG/ML
50 INJECTION INTRAMUSCULAR; INTRAVENOUS AS NEEDED
Status: DISCONTINUED | OUTPATIENT
Start: 2024-10-07 | End: 2024-10-07 | Stop reason: HOSPADM

## 2024-10-07 RX ORDER — ACETAMINOPHEN 325 MG/1
650 TABLET ORAL ONCE
OUTPATIENT
Start: 2024-11-18 | End: 2024-11-18

## 2024-10-07 ASSESSMENT — ENCOUNTER SYMPTOMS
VOMITING: 0
NUMBNESS: 0
DYSURIA: 0
PALPITATIONS: 0
ARTHRALGIAS: 0
LIGHT-HEADEDNESS: 0
HEADACHES: 0
COUGH: 0
WHEEZING: 0
NERVOUS/ANXIOUS: 0
CHILLS: 0
ABDOMINAL PAIN: 0
TROUBLE SWALLOWING: 0
WOUND: 0
NAUSEA: 0
DIARRHEA: 0
LEG SWELLING: 0
EYE PROBLEMS: 0
FREQUENCY: 0
DIZZINESS: 0
CONSTIPATION: 0
FEVER: 0
CHEST TIGHTNESS: 0
VOICE CHANGE: 0
SHORTNESS OF BREATH: 0
DEPRESSION: 0
EXTREMITY WEAKNESS: 0
UNEXPECTED WEIGHT CHANGE: 0
APPETITE CHANGE: 0
FATIGUE: 0
MYALGIAS: 0
HEMATURIA: 0
SORE THROAT: 0
BLOOD IN STOOL: 0

## 2024-10-07 NOTE — PATIENT INSTRUCTIONS
Today :We administered inFLIXimab-dyyb (Inflectra) 900 mg in sodium chloride 0.9% 250 mL IV, acetaminophen, methylPREDNISolone sod succinate, and diphenhydrAMINE.     For:   1. Ulcerative pancolitis with rectal bleeding (Multi)         Your next appointment is due in:  56 DAYS         Please read the  Medication Guide that was given to you and reviewed during todays visit.     (Tell all doctors including dentists that you are taking this medication)     Go to the emergency room or call 911 if:  -You have signs of allergic reaction:   -Rash, hives, itching.   -Swollen, blistered, peeling skin.   -Swelling of face, lips, mouth, tongue or throat.   -Tightness of chest, trouble breathing, swallowing or talking     Call your doctor:  - If IV / injection site gets red, warm, swollen, itchy or leaks fluid or pus.     (Leave dressing on your IV site for at least 2 hours and keep area clean and dry  - If you get sick or have symptoms of infection or are not feeling well for any reason.    (Wash your hands often, stay away from people who are sick)  - If you have side effects from your medication that do not go away or are bothersome.     (Refer to the teaching your nurse gave you for side effects to call your doctor about)    - Common side effects may include:  stuffy nose, headache, feeling tired, muscle aches, upset stomach  - Before receiving any vaccines     - [facility] at   If:  - You get sick, are on antibiotics, have had a recent vaccine, have surgery or dental work and your doctor wants your visit rescheduled.  - You need to cancel and reschedule your visit for any reason. Call at least 2 days before your visit if you need to cancel.   - Your insurance changes before your next visit.    (We will need to get approval from your new insurance. This can take up to two weeks.)     [facility] is opened Monday thru Friday. We are closed on weekends and holidays.   Voice mail  will take your call if the center is closed. If you leave a message please allow 24 hours for a call back during weekdays. If you leave a message on a weekend/holiday, we will call you back the next business day.

## 2024-10-07 NOTE — PROGRESS NOTES
[facility]   Infusion Clinic Note   Date: 2024   Name: Raleigh Owens  : 1965   MRN: [id number]          Reason for Visit: OP Infusion (PT HERE FOR Q 56 DAY INFLECTRA 900 MG INFUSION)         Today: We administered inFLIXimab-dyyb (Inflectra) 900 mg in sodium chloride 0.9% 250 mL IV, acetaminophen, methylPREDNISolone sod succinate, and diphenhydrAMINE.       Visit Type: INFUSION       Ordered By: DR. ALANIS        Accompanied by:Self       Diagnosis: Ulcerative pancolitis with rectal bleeding (Multi)        Allergies:   Allergies as of 10/07/2024    (No Known Allergies)          Current Medications has a current medication list which includes the following prescription(s): acetaminophen, calcium carbonate-vitamin d3, cholecalciferol, esomeprazole, folic acid, ibuprofen, infliximab-dyyb, essential man, sulfasalazine, and valacyclovir, and the following Facility-Administered Medications: albuterol, dextrose, diphenhydramine, epinephrine, famotidine pf, methylprednisolone sod succinate, and sodium chloride.       Vitals:   Vitals:    10/07/24 0715 10/07/24 0815 10/07/24 0915   BP: 148/88 133/78 135/88   Pulse: 82 74 79   Resp: 16 16 16   Temp: 36.5 °C (97.7 °F) 36.3 °C (97.3 °F) 36.4 °C (97.6 °F)   SpO2: 94% 93% 95%   Weight: 100 kg (220 lb 10.9 oz)               Infusion Pre-procedure Checklist:   - Allergies reviewed: yes   - Medications reviewed: yes       - Previous reaction to current treatment: no      Assess patient for the concerns below. Document provider notification as appropriate.  - Active or recent infection with/without current antibiotic use: no  - Recent or planned invasive dental work: no  - Recent or planned surgeries: no  - Recently received or plans to receive vaccinations: no  - Has treatment related toxicities: no  - Is pregnant:  n/a      Pain: 0   - Is the pain different from normal: n/a   - Is your Doctor aware:  n/a       Labs: N/A          Fall Risk  Screening: Poe Fall Risk  History of Falling, Immediate or Within 3 Months: No  Secondary Diagnosis: No  Ambulatory Aid: Walks without aid/bedrest/nurse assist  Intravenous Therapy/Heparin Lock: Yes  Gait/Transferring: Normal/bedrest/immobile  Mental Status: Oriented to own ability  Poe Fall Risk Score: 20       Review Of Systems:  Review of Systems   Constitutional:  Negative for appetite change, chills, fatigue, fever and unexpected weight change.   HENT:   Negative for hearing loss, mouth sores, sore throat, tinnitus, trouble swallowing and voice change.    Eyes:  Negative for eye problems.   Respiratory:  Negative for chest tightness, cough, shortness of breath and wheezing.    Cardiovascular:  Negative for chest pain, leg swelling and palpitations.   Gastrointestinal:  Negative for abdominal pain, blood in stool, constipation, diarrhea, nausea and vomiting.        PT WITH A DX OF IBD REPORTS # 2-3 FORMED  BM'S PER DAY. denies NOTING BLOOD/MUCUS TO STOOLS.  DENIES ABDOMINAL PAIN AND/OR BOUTS OF NOCTURNAL STOOLING.      Genitourinary:  Negative for dysuria, frequency and hematuria.    Musculoskeletal:  Negative for arthralgias, gait problem and myalgias.   Skin:  Negative for itching, rash and wound.   Neurological:  Negative for dizziness, extremity weakness, gait problem, headaches, light-headedness and numbness.   Psychiatric/Behavioral:  Negative for depression. The patient is not nervous/anxious.          ROS completed? Yes      Infusion Readiness:  - Assessment Concerns Related to Infusion: No  - Provider notified: n/a      Document Below Only If Indicated:   New Patient Education:    N/A (returning patient for continuation of therapy. Ongoing education provided as needed.)        Treatment Conditions & Drug Specific Questions:    InFLIXimab  (AVSOLA, INFLECTRA, REMICADE, RENFLEXIS)    (Unless otherwise specified on patient specific therapy plan):     TREATMENT CONDITIONS:  Unless otherwise specified on  "patient specific therapy plan HOLD and notify Provider prior to proceeding with today's infusion if patient with:  o Positive T-Spot  o Reactive Hep B sAg and/or Hep B Core Antibody    Lab Results   Component Value Date    TBSIN Negative 02/15/2023    QFG NEGATIVE 01/25/2018      Lab Results   Component Value Date    HEPBSAG Nonreactive 06/03/2024      No results found for: \"NONUHFIRE\", \"NONUHSWGH\", \"NONUHFISH\", \"EXTHEPBSAG\"  No results found for: \"HBCTI\", \"HEPBCAB\"  Lab Results   Component Value Date    HEPCAB NONREACTIVE 05/10/2022        Labs reviewed and patient meets treatment conditions? Yes    DRUG SPECIFIC QUESTIONS:    Any new or worsening signs / symptoms of heart failure which may include things like worsening shortness of breath, swelling, fatigue? No   (If yes notify provider before proceeding with today's infusion. New onset or worsening HF have been reported with infliximab)    REMINDER:   WEIGHT BASED DRUG     Recommended Vitals/Observation:  Vitals:     Induction: Obtain vital signs every 30 minutes; at end of observation period and as needed.     Maintenance: Obtain vital signs at start and end of infusions  Observation:     Induction: Patient is monitored for 30 minutes post-infusion     Maintenance: No observation.        Weight Based Drug Calculations:    WEIGHT BASED DRUGS: Infliximab (REMICADE, INFLECTRA, RENFLEXIS)   Patient's dosing weight (kg): 98 KG      10% weight variance for prescribed treatment: 88.2 kg to 107.8 kg     Patient's weight today:   Vitals:    10/07/24 0715   Weight: 100 kg (220 lb 10.9 oz)         weight range for prescribed dose:     Patient weight today falls outside of 10% variance or  weight range: No     Home Care pharmacist informed of weight variance: Not applicable    Doses that are weight based have an acceptable variance rule within 10% of the prescribed   order and/or within  weight range. If patient weight on day of " infusion falls   outside of the 10% variance, or weight range, infusion is administered and   pharmacy contacted regarding future dosing adjustments, per policy.         Initiated By: Faye Aldana RN

## 2024-10-23 DIAGNOSIS — K51.011 ULCERATIVE PANCOLITIS WITH RECTAL BLEEDING (MULTI): Primary | ICD-10-CM

## 2024-12-02 ENCOUNTER — APPOINTMENT (OUTPATIENT)
Dept: INFUSION THERAPY | Facility: CLINIC | Age: 59
End: 2024-12-02
Payer: COMMERCIAL

## 2024-12-02 VITALS
TEMPERATURE: 97.9 F | HEART RATE: 78 BPM | BODY MASS INDEX: 33.09 KG/M2 | OXYGEN SATURATION: 98 % | SYSTOLIC BLOOD PRESSURE: 143 MMHG | WEIGHT: 224.1 LBS | DIASTOLIC BLOOD PRESSURE: 87 MMHG | RESPIRATION RATE: 18 BRPM

## 2024-12-02 DIAGNOSIS — K51.011 ULCERATIVE PANCOLITIS WITH RECTAL BLEEDING (MULTI): ICD-10-CM

## 2024-12-02 PROCEDURE — 96413 CHEMO IV INFUSION 1 HR: CPT | Performed by: NURSE PRACTITIONER

## 2024-12-02 PROCEDURE — 96375 TX/PRO/DX INJ NEW DRUG ADDON: CPT | Performed by: NURSE PRACTITIONER

## 2024-12-02 RX ORDER — EPINEPHRINE 0.3 MG/.3ML
0.3 INJECTION SUBCUTANEOUS EVERY 5 MIN PRN
OUTPATIENT
Start: 2025-01-27

## 2024-12-02 RX ORDER — ALBUTEROL SULFATE 0.83 MG/ML
3 SOLUTION RESPIRATORY (INHALATION) AS NEEDED
OUTPATIENT
Start: 2025-01-27

## 2024-12-02 RX ORDER — DIPHENHYDRAMINE HCL 25 MG
25 CAPSULE ORAL ONCE
OUTPATIENT
Start: 2025-01-27 | End: 2025-01-27

## 2024-12-02 RX ORDER — ACETAMINOPHEN 325 MG/1
650 TABLET ORAL ONCE
OUTPATIENT
Start: 2025-01-27 | End: 2025-01-27

## 2024-12-02 RX ORDER — FAMOTIDINE 10 MG/ML
20 INJECTION INTRAVENOUS ONCE AS NEEDED
OUTPATIENT
Start: 2025-01-27

## 2024-12-02 RX ORDER — DIPHENHYDRAMINE HYDROCHLORIDE 50 MG/ML
50 INJECTION INTRAMUSCULAR; INTRAVENOUS AS NEEDED
OUTPATIENT
Start: 2025-01-27

## 2024-12-02 RX ORDER — ACETAMINOPHEN 325 MG/1
650 TABLET ORAL ONCE
Status: COMPLETED | OUTPATIENT
Start: 2024-12-02 | End: 2024-12-02

## 2024-12-02 RX ORDER — DIPHENHYDRAMINE HCL 25 MG
25 CAPSULE ORAL ONCE
Status: COMPLETED | OUTPATIENT
Start: 2024-12-02 | End: 2024-12-02

## 2024-12-02 ASSESSMENT — ENCOUNTER SYMPTOMS
ABDOMINAL PAIN: 0
UNEXPECTED WEIGHT CHANGE: 0
HEMATURIA: 0
DEPRESSION: 0
SHORTNESS OF BREATH: 0
NERVOUS/ANXIOUS: 0
EYE PROBLEMS: 0
DYSURIA: 0
LEG SWELLING: 0
FEVER: 0
CHEST TIGHTNESS: 0
SORE THROAT: 0
WHEEZING: 0
LIGHT-HEADEDNESS: 0
CHILLS: 0
NAUSEA: 0
HEADACHES: 0
BLOOD IN STOOL: 0
DIARRHEA: 0
WOUND: 0
TROUBLE SWALLOWING: 0
NUMBNESS: 0
EXTREMITY WEAKNESS: 0
APPETITE CHANGE: 0
FATIGUE: 0
VOMITING: 0
ARTHRALGIAS: 0
COUGH: 0
VOICE CHANGE: 0
PALPITATIONS: 0
MYALGIAS: 0
CONSTIPATION: 0
FREQUENCY: 0
DIZZINESS: 0

## 2024-12-02 NOTE — PROGRESS NOTES
[facility]   Infusion Clinic Note   Date: 2024   Name: Raleigh Owens  : 1965   MRN: [id number]          Reason for Visit: OP Infusion (PT HERE FOR Q 56 DAY INFLIXIMAB 900MG INFUSION)         Today: We administered acetaminophen, methylPREDNISolone sod succinate, diphenhydrAMINE, and inFLIXimab-dyyb (Inflectra) 900 mg in sodium chloride 0.9% 250 mL IV.       Visit Type: INFUSION       Ordered By: DR. ALANIS        Accompanied by:Self       Diagnosis: Ulcerative pancolitis with rectal bleeding (Multi)        Allergies:   Allergies as of 2024    (No Known Allergies)          Current Medications has a current medication list which includes the following prescription(s): acetaminophen, calcium carbonate-vitamin d3, cholecalciferol, esomeprazole, folic acid, ibuprofen, infliximab-dyyb, essential man, sulfasalazine, and valacyclovir.       Vitals:   Vitals:    24 0935 24 1035 24 1050 24 1131   BP: (!) 153/91  Comment: nurse notified 124/74 134/84 143/87   Pulse: 99  Comment: nurse notified 76 75 78   Resp: 18 17 18 18   Temp: 36.6 °C (97.9 °F) 36.6 °C (97.8 °F) 36.7 °C (98.1 °F) 36.6 °C (97.9 °F)   SpO2: 95% 94% 98% 98%   Weight: 102 kg (224 lb 1.6 oz)                Infusion Pre-procedure Checklist:   - Allergies reviewed: yes   - Medications reviewed: yes       - Previous reaction to current treatment: no      Assess patient for the concerns below. Document provider notification as appropriate.  - Active or recent infection with/without current antibiotic use: no  - Recent or planned invasive dental work: no  - Recent or planned surgeries: no  - Recently received or plans to receive vaccinations: no  - Has treatment related toxicities: no  - Is pregnant:  n/a      Pain: 0   - Is the pain different from normal: n/a   - Is your Doctor aware:  n/a       Labs: N/A          Fall Risk Screening: Deepika Fall Risk  History of Falling, Immediate or Within 3  Months: No  Secondary Diagnosis: No  Ambulatory Aid: Walks without aid/bedrest/nurse assist  Intravenous Therapy/Heparin Lock: Yes  Gait/Transferring: Normal/bedrest/immobile  Mental Status: Oriented to own ability  Poe Fall Risk Score: 20       Review Of Systems:  Review of Systems   Constitutional:  Negative for appetite change, chills, fatigue, fever and unexpected weight change.   HENT:   Negative for hearing loss, mouth sores, sore throat, tinnitus, trouble swallowing and voice change.    Eyes:  Negative for eye problems.   Respiratory:  Negative for chest tightness, cough, shortness of breath and wheezing.    Cardiovascular:  Negative for chest pain, leg swelling and palpitations.   Gastrointestinal:  Negative for abdominal pain, blood in stool, constipation, diarrhea, nausea and vomiting.        PT WITH A DX OF IBD REPORTS # 2-3 FORMED  BM'S PER DAY. denies NOTING BLOOD/MUCUS TO STOOLS.  DENIES ABDOMINAL PAIN AND/OR BOUTS OF NOCTURNAL STOOLING.      Genitourinary:  Negative for dysuria, frequency and hematuria.    Musculoskeletal:  Negative for arthralgias, gait problem and myalgias.   Skin:  Negative for itching, rash and wound.   Neurological:  Negative for dizziness, extremity weakness, gait problem, headaches, light-headedness and numbness.   Psychiatric/Behavioral:  Negative for depression. The patient is not nervous/anxious.          ROS completed? Yes      Infusion Readiness:  - Assessment Concerns Related to Infusion: No  - Provider notified: n/a      Document Below Only If Indicated:   New Patient Education:    N/A (returning patient for continuation of therapy. Ongoing education provided as needed.)        Treatment Conditions & Drug Specific Questions:    InFLIXimab  (AVSOLA, INFLECTRA, REMICADE, RENFLEXIS)    (Unless otherwise specified on patient specific therapy plan):     TREATMENT CONDITIONS:  Unless otherwise specified on patient specific therapy plan HOLD and notify Provider prior to  "proceeding with today's infusion if patient with:  o Positive T-Spot  o Reactive Hep B sAg and/or Hep B Core Antibody    Lab Results   Component Value Date    TBSIN Negative 02/15/2023    QFG NEGATIVE 01/25/2018      Lab Results   Component Value Date    HEPBSAG Nonreactive 06/03/2024      No results found for: \"NONUHFIRE\", \"NONUHSWGH\", \"NONUHFISH\", \"EXTHEPBSAG\"  No results found for: \"HBCTI\", \"HEPBCAB\"  Lab Results   Component Value Date    HEPCAB NONREACTIVE 05/10/2022        Labs reviewed and patient meets treatment conditions? Yes    DRUG SPECIFIC QUESTIONS:    Any new or worsening signs / symptoms of heart failure which may include things like worsening shortness of breath, swelling, fatigue? No   (If yes notify provider before proceeding with today's infusion. New onset or worsening HF have been reported with infliximab)    REMINDER:   WEIGHT BASED DRUG     Recommended Vitals/Observation:  Vitals:     Induction: Obtain vital signs every 30 minutes; at end of observation period and as needed.     Maintenance: Obtain vital signs at start and end of infusions  Observation:     Induction: Patient is monitored for 30 minutes post-infusion     Maintenance: No observation.        Weight Based Drug Calculations:    WEIGHT BASED DRUGS: Infliximab (REMICADE, INFLECTRA, RENFLEXIS)   Patient's dosing weight (kg): 98 KG      10% weight variance for prescribed treatment: 88.2 kg to 107.8 kg     Patient's weight today:   Vitals:    12/02/24 0935   Weight: 102 kg (224 lb 1.6 oz)         weight range for prescribed dose:     Patient weight today falls outside of 10% variance or  weight range: No     Home Care pharmacist informed of weight variance: Not applicable    Doses that are weight based have an acceptable variance rule within 10% of the prescribed   order and/or within  weight range. If patient weight on day of infusion falls   outside of the 10% variance, or weight range, infusion " is administered and   pharmacy contacted regarding future dosing adjustments, per policy.         Initiated By: Faye Aldana RN

## 2024-12-02 NOTE — PATIENT INSTRUCTIONS
Today :We administered acetaminophen, methylPREDNISolone sod succinate, diphenhydrAMINE, and inFLIXimab-dyyb (Inflectra) 900 mg in sodium chloride 0.9% 250 mL IV.     For:   1. Ulcerative pancolitis with rectal bleeding (Multi)         Your next appointment is due in:  56 DAYS         Please read the  Medication Guide that was given to you and reviewed during todays visit.     (Tell all doctors including dentists that you are taking this medication)     Go to the emergency room or call 911 if:  -You have signs of allergic reaction:   -Rash, hives, itching.   -Swollen, blistered, peeling skin.   -Swelling of face, lips, mouth, tongue or throat.   -Tightness of chest, trouble breathing, swallowing or talking     Call your doctor:  - If IV / injection site gets red, warm, swollen, itchy or leaks fluid or pus.     (Leave dressing on your IV site for at least 2 hours and keep area clean and dry  - If you get sick or have symptoms of infection or are not feeling well for any reason.    (Wash your hands often, stay away from people who are sick)  - If you have side effects from your medication that do not go away or are bothersome.     (Refer to the teaching your nurse gave you for side effects to call your doctor about)    - Common side effects may include:  stuffy nose, headache, feeling tired, muscle aches, upset stomach  - Before receiving any vaccines     - Call [facility] at   If:  - You get sick, are on antibiotics, have had a recent vaccine, have surgery or dental work and your doctor wants your visit rescheduled.  - You need to cancel and reschedule your visit for any reason. Call at least 2 days before your visit if you need to cancel.   - Your insurance changes before your next visit.    (We will need to get approval from your new insurance. This can take up to two weeks.)     [facility] is opened Monday thru Friday. We are closed on weekends and holidays.   Voice mail  will take your call if the center is closed. If you leave a message please allow 24 hours for a call back during weekdays. If you leave a message on a weekend/holiday, we will call you back the next business day.    A pharmacist is available Monday - Friday from 8:30AM to 3:30PM to help answer any questions you may have about your prescriptions(s). Please call pharmacy at:    [facility]: [phone]  [facility]: [phone]  [facility]: [phone]

## 2025-01-27 ENCOUNTER — APPOINTMENT (OUTPATIENT)
Dept: INFUSION THERAPY | Facility: CLINIC | Age: 60
End: 2025-01-27
Payer: COMMERCIAL

## 2025-01-27 VITALS
OXYGEN SATURATION: 93 % | BODY MASS INDEX: 32.74 KG/M2 | DIASTOLIC BLOOD PRESSURE: 78 MMHG | WEIGHT: 221.67 LBS | TEMPERATURE: 98.4 F | HEART RATE: 85 BPM | RESPIRATION RATE: 18 BRPM | SYSTOLIC BLOOD PRESSURE: 145 MMHG

## 2025-01-27 DIAGNOSIS — K51.011 ULCERATIVE PANCOLITIS WITH RECTAL BLEEDING (MULTI): ICD-10-CM

## 2025-01-27 PROCEDURE — 96375 TX/PRO/DX INJ NEW DRUG ADDON: CPT | Performed by: NURSE PRACTITIONER

## 2025-01-27 PROCEDURE — 96413 CHEMO IV INFUSION 1 HR: CPT | Performed by: NURSE PRACTITIONER

## 2025-01-27 RX ORDER — ACETAMINOPHEN 325 MG/1
650 TABLET ORAL ONCE
Status: COMPLETED | OUTPATIENT
Start: 2025-01-27 | End: 2025-01-27

## 2025-01-27 RX ORDER — DIPHENHYDRAMINE HYDROCHLORIDE 50 MG/ML
50 INJECTION INTRAMUSCULAR; INTRAVENOUS AS NEEDED
OUTPATIENT
Start: 2025-03-24

## 2025-01-27 RX ORDER — ALBUTEROL SULFATE 0.83 MG/ML
3 SOLUTION RESPIRATORY (INHALATION) AS NEEDED
OUTPATIENT
Start: 2025-03-24

## 2025-01-27 RX ORDER — FAMOTIDINE 10 MG/ML
20 INJECTION INTRAVENOUS ONCE AS NEEDED
OUTPATIENT
Start: 2025-03-24

## 2025-01-27 RX ORDER — DIPHENHYDRAMINE HCL 25 MG
25 CAPSULE ORAL ONCE
OUTPATIENT
Start: 2025-03-24 | End: 2025-03-24

## 2025-01-27 RX ORDER — ACETAMINOPHEN 325 MG/1
650 TABLET ORAL ONCE
OUTPATIENT
Start: 2025-03-24 | End: 2025-03-24

## 2025-01-27 RX ORDER — DIPHENHYDRAMINE HCL 25 MG
25 CAPSULE ORAL ONCE
Status: COMPLETED | OUTPATIENT
Start: 2025-01-27 | End: 2025-01-27

## 2025-01-27 RX ORDER — EPINEPHRINE 0.3 MG/.3ML
0.3 INJECTION SUBCUTANEOUS EVERY 5 MIN PRN
OUTPATIENT
Start: 2025-03-24

## 2025-01-27 RX ADMIN — Medication 25 MG: at 11:25

## 2025-01-27 RX ADMIN — ACETAMINOPHEN 650 MG: 325 TABLET ORAL at 11:25

## 2025-01-27 ASSESSMENT — ENCOUNTER SYMPTOMS
HEADACHES: 0
LIGHT-HEADEDNESS: 0
DIZZINESS: 0
LEG SWELLING: 0
NERVOUS/ANXIOUS: 0
UNEXPECTED WEIGHT CHANGE: 0
FREQUENCY: 0
VOMITING: 0
CHEST TIGHTNESS: 0
DYSURIA: 0
MYALGIAS: 0
PALPITATIONS: 0
DIARRHEA: 0
ABDOMINAL PAIN: 0
WHEEZING: 0
ARTHRALGIAS: 1
NAUSEA: 0
COUGH: 0
HEMATURIA: 0
CONSTIPATION: 0
TROUBLE SWALLOWING: 0
EXTREMITY WEAKNESS: 0
SORE THROAT: 0
BLOOD IN STOOL: 0
WOUND: 0
EYE PROBLEMS: 0
SHORTNESS OF BREATH: 0
FATIGUE: 0
NUMBNESS: 0
CHILLS: 0
APPETITE CHANGE: 0
DEPRESSION: 0
FEVER: 0

## 2025-01-27 NOTE — PROGRESS NOTES
[facility]   Infusion Clinic Note   Date: 2025   Name: Raleigh Owens  : 1965   MRN: [id number]          Reason for Visit: OP Infusion (PT HERE FOR Q56 DAY INFLIXIMAB 900 MG INFUSION)         Today: We administered acetaminophen, methylPREDNISolone sod succinate, diphenhydrAMINE, and inFLIXimab-dyyb (Inflectra) 900 mg in sodium chloride 0.9% 250 mL IV.       Visit Type: INFUSION       Ordered By: Sagar Michael MD        Accompanied by: Self       Diagnosis: Ulcerative pancolitis with rectal bleeding (Multi)        Allergies:   Allergies as of 2025    (No Known Allergies)          Current Medications: has a current medication list which includes the following prescription(s): acetaminophen, calcium carbonate-vitamin d3, cholecalciferol, esomeprazole, folic acid, ibuprofen, infliximab-dyyb, essential man, sulfasalazine, and valacyclovir.       Vitals:   Vitals:    25 1047 25 1048 25 1200 25 1303   BP: 164/90  Comment: NURSE NOTIFIED 135/88  Comment: NURSE NOTIFIED 149/86 145/78   Pulse: 106  Comment: NURSE NOTIFIED 98 90 85   Resp: 18  17 18   Temp: 36.1 °C (97 °F)  36.3 °C (97.3 °F) 36.9 °C (98.4 °F)   SpO2: 95%  92% 93%   Weight: 101 kg (221 lb 10.8 oz)                Infusion Pre-procedure Checklist:   - Allergies & Medications reviewed: yes       - Previous reaction to current treatment: NO      Assess patient for the concerns below. Document provider notification as appropriate.  - Active or recent infection with/without current antibiotic use: no  - Recent or planned invasive dental work: no  - Recent or planned surgeries: yes PENDING L HIP REPLACEMENT SURGERY  - Recently received or plans to receive vaccinations: no  - Has treatment related toxicities: no  - Any chance you may be pregnant:  n/a      Pain: 0   - Is the pain different from normal: n/a   - Is your Doctor aware:  n/a       Labs: N/A          Fall Risk Screening: Deepika  Fall Risk  History of Falling, Immediate or Within 3 Months: No  Secondary Diagnosis: No  Ambulatory Aid: Walks without aid/bedrest/nurse assist  Intravenous Therapy/Heparin Lock: Yes  Gait/Transferring: Normal/bedrest/immobile  Mental Status: Oriented to own ability  Poe Fall Risk Score: 20       Review Of Systems:  Review of Systems   Constitutional:  Negative for appetite change, chills, fatigue, fever and unexpected weight change.   HENT:   Negative for hearing loss, mouth sores, sore throat, tinnitus and trouble swallowing.    Eyes:  Negative for eye problems.   Respiratory:  Negative for chest tightness, cough, shortness of breath and wheezing.    Cardiovascular:  Negative for chest pain, leg swelling and palpitations.   Gastrointestinal:  Negative for abdominal pain, blood in stool, constipation, diarrhea, nausea and vomiting.        ADMITS TO 3 BMS PER DAY, SOFT AND FORMED IN CONSISTENCY.   DENIES BLOOD, MUCOUS OR PAIN  DENIES NOCTURNAL STOOLING.    Genitourinary:  Negative for dysuria, frequency and hematuria.    Musculoskeletal:  Positive for arthralgias. Negative for gait problem and myalgias.   Skin:  Negative for itching, rash and wound.   Neurological:  Negative for dizziness, extremity weakness, gait problem, headaches, light-headedness and numbness.   Psychiatric/Behavioral:  Negative for depression. The patient is not nervous/anxious.          ROS completed? Yes      Infusion Readiness:  - Assessment Concerns Related to Infusion: No  - Provider notified: n/a      Document Below Only If Indicated:   New Patient Education:    N/A (returning patient for continuation of therapy. Ongoing education provided as needed.)        Treatment Conditions & Drug Specific Questions:    InFLIXimab  (AVSOLA, INFLECTRA, REMICADE, RENFLEXIS)    (Unless otherwise specified on patient specific therapy plan):     TREATMENT CONDITIONS:  Unless otherwise specified on patient specific therapy plan HOLD and notify Provider  "prior to proceeding with today's infusion if patient with:  o Positive T-Spot  o Reactive Hep B sAg and/or Hep B Core Antibody    Lab Results   Component Value Date    TBSIN Negative 02/15/2023    QFG NEGATIVE 01/25/2018      Lab Results   Component Value Date    HEPBSAG Nonreactive 06/03/2024      No results found for: \"NONUHFIRE\", \"NONUHSWGH\", \"NONUHFISH\", \"EXTHEPBSAG\"  No results found for: \"HBCTI\", \"HEPBCAB\"  Lab Results   Component Value Date    HEPCAB NONREACTIVE 05/10/2022        Labs reviewed and patient meets treatment conditions? Yes    DRUG SPECIFIC QUESTIONS:    Any new or worsening signs / symptoms of heart failure which may include things like worsening shortness of breath, swelling, fatigue? No   (If yes notify provider before proceeding with today's infusion. New onset or worsening HF have been reported with infliximab)    REMINDER:   WEIGHT BASED DRUG     Recommended Vitals/Observation:  Vitals:     Induction: Obtain vital signs every 30 minutes; at end of observation period and as needed.     Maintenance: Obtain vital signs at start and end of infusions  Observation:     Induction: Patient is monitored for 30 minutes post-infusion      Maintenance: No observation.        Weight Based Drug Calculations:    WEIGHT BASED DRUGS: Infliximab (REMICADE, INFLECTRA, RENFLEXIS)   Patient's dosing weight (kg): 98 KG     10% weight variance for prescribed treatment: 88.2 kg to 107.8 kg     Patient's weight today:   Vitals:    01/27/25 1047   Weight: 101 kg (221 lb 10.8 oz)         weight range for prescribed dose:     Patient weight today falls outside of 10% variance or  weight range: No     Home Care pharmacist informed of weight variance: Not applicable    Doses that are weight based have an acceptable variance rule within 10% of the prescribed   order and/or within  weight range. If patient weight on day of infusion falls   outside of the 10% variance, or weight range, " infusion is administered and   pharmacy contacted regarding future dosing adjustments, per policy.         Initiated By: Faye Adlana RN

## 2025-01-27 NOTE — PATIENT INSTRUCTIONS
Today :We administered acetaminophen, methylPREDNISolone sod succinate, diphenhydrAMINE, and inFLIXimab-dyyb (Inflectra) 900 mg in sodium chloride 0.9% 250 mL IV.     For:   1. Ulcerative pancolitis with rectal bleeding (Multi)         Your next appointment is due in:  56 DAYS        Please read the  Medication Guide that was given to you and reviewed during todays visit.     (Tell all doctors including dentists that you are taking this medication)     Go to the emergency room or call 911 if:  -You have signs of allergic reaction:   -Rash, hives, itching.   -Swollen, blistered, peeling skin.   -Swelling of face, lips, mouth, tongue or throat.   -Tightness of chest, trouble breathing, swallowing or talking     Call your doctor:  - If IV / injection site gets red, warm, swollen, itchy or leaks fluid or pus.     (Leave dressing on your IV site for at least 2 hours and keep area clean and dry  - If you get sick or have symptoms of infection or are not feeling well for any reason.    (Wash your hands often, stay away from people who are sick)  - If you have side effects from your medication that do not go away or are bothersome.     (Refer to the teaching your nurse gave you for side effects to call your doctor about)    - Common side effects may include:  stuffy nose, headache, feeling tired, muscle aches, upset stomach  - Before receiving any vaccines     - Call the [facility] at   If:  - You get sick, are on antibiotics, have had a recent vaccine, have surgery or dental work and your doctor wants your visit rescheduled.  - You need to cancel and reschedule your visit for any reason. Call at least 2 days before your visit if you need to cancel.   - Your insurance changes before your next visit.    (We will need to get approval from your new insurance. This can take up to two weeks.)     [facility] is opened Monday thru Friday. We are closed on weekends and holidays.   Voice mail  will take your call if the center is closed. If you leave a message please allow 24 hours for a call back during weekdays. If you leave a message on a weekend/holiday, we will call you back the next business day.    A pharmacist is available Monday - Friday from 8:30AM to 3:30PM to help answer any questions you may have about your prescriptions(s). Please call pharmacy at:    [facility]: [phone]  [facility]: [phone]  [facility]: [phone]

## 2025-02-04 ENCOUNTER — HOSPITAL ENCOUNTER (OUTPATIENT)
Dept: RADIOLOGY | Facility: HOSPITAL | Age: 60
Discharge: HOME | End: 2025-02-04
Payer: COMMERCIAL

## 2025-02-04 ENCOUNTER — TELEPHONE (OUTPATIENT)
Dept: ORTHOPEDIC SURGERY | Facility: CLINIC | Age: 60
End: 2025-02-04

## 2025-02-04 ENCOUNTER — APPOINTMENT (OUTPATIENT)
Dept: ORTHOPEDIC SURGERY | Facility: CLINIC | Age: 60
End: 2025-02-04
Payer: COMMERCIAL

## 2025-02-04 VITALS — HEIGHT: 69 IN | BODY MASS INDEX: 31.84 KG/M2 | WEIGHT: 215 LBS

## 2025-02-04 DIAGNOSIS — M25.552 BILATERAL HIP PAIN: ICD-10-CM

## 2025-02-04 DIAGNOSIS — M25.551 BILATERAL HIP PAIN: ICD-10-CM

## 2025-02-04 DIAGNOSIS — M25.551 RIGHT HIP PAIN: ICD-10-CM

## 2025-02-04 DIAGNOSIS — M62.838 MUSCLE SPASM: Primary | ICD-10-CM

## 2025-02-04 DIAGNOSIS — G89.29 CHRONIC PAIN OF LEFT KNEE: ICD-10-CM

## 2025-02-04 DIAGNOSIS — M25.552 LEFT HIP PAIN: Primary | ICD-10-CM

## 2025-02-04 DIAGNOSIS — M25.562 CHRONIC PAIN OF LEFT KNEE: ICD-10-CM

## 2025-02-04 DIAGNOSIS — M25.552 LEFT HIP PAIN: ICD-10-CM

## 2025-02-04 PROCEDURE — 73502 X-RAY EXAM HIP UNI 2-3 VIEWS: CPT | Mod: LT

## 2025-02-04 PROCEDURE — 73564 X-RAY EXAM KNEE 4 OR MORE: CPT | Mod: LT

## 2025-02-04 PROCEDURE — 3008F BODY MASS INDEX DOCD: CPT | Performed by: ORTHOPAEDIC SURGERY

## 2025-02-04 PROCEDURE — 99215 OFFICE O/P EST HI 40 MIN: CPT | Performed by: ORTHOPAEDIC SURGERY

## 2025-02-04 PROCEDURE — 73562 X-RAY EXAM OF KNEE 3: CPT | Mod: RT

## 2025-02-04 RX ORDER — ACETAMINOPHEN 325 MG/1
975 TABLET ORAL ONCE
OUTPATIENT
Start: 2025-02-04 | End: 2025-02-04

## 2025-02-04 RX ORDER — CEFAZOLIN SODIUM 2 G/100ML
2 INJECTION, SOLUTION INTRAVENOUS ONCE
OUTPATIENT
Start: 2025-02-04 | End: 2025-02-04

## 2025-02-04 RX ORDER — CELECOXIB 400 MG/1
400 CAPSULE ORAL ONCE
OUTPATIENT
Start: 2025-02-04 | End: 2025-02-04

## 2025-02-04 RX ORDER — DICLOFENAC SODIUM 10 MG/G
4 GEL TOPICAL 4 TIMES DAILY
Qty: 450 G | Refills: 0 | Status: SHIPPED | OUTPATIENT
Start: 2025-02-04

## 2025-02-04 ASSESSMENT — PAIN SCALES - GENERAL: PAINLEVEL_OUTOF10: 9

## 2025-02-04 ASSESSMENT — PAIN - FUNCTIONAL ASSESSMENT: PAIN_FUNCTIONAL_ASSESSMENT: 0-10

## 2025-02-04 NOTE — TELEPHONE ENCOUNTER
He was asking if a muscle relaxer would help him with his pain at night? Is that something we can fill for him?

## 2025-02-04 NOTE — PROGRESS NOTES
This is a consultation from Dr. Madeleine Llanos DO for   Chief Complaint   Patient presents with    Left Knee - Pain    Left Ankle - Pain    Left Hip - Pain       This is a 59 y.o. male who presents for follow-up for his left hip.  Patient has a known left hip arthritis, unfortunately his situation is getting worse.  He has had pain for years, he had a surgery done to attempt to treat his avascular necrosis many years ago.  In the last couple years things have gotten worse and in the last few months it has been severely exacerbated.  Complains of a severe deep sharp stabbing pain in the anterior hip and groin.  Radiates into his posterior hip.  No pain down the leg no numbness or tingling.  He is an extensive trial of nonsurgical management clued and use of anti-inflammatories physical therapy activity modification use of assistive devices cortisone injections.  Despite that he has severe and worsening pain which impacts his quality of life and activities of daily living but to consider total hip    Physical Exam    There has been no interval change in this patient's past medical, surgical, medications, allergies, family history or social history since the most recent visit to a provider within our department. 14 point review of systems was performed, reviewed, and negative except for pertinent positives documented in the history of present illness.     Constitutional: well developed, well nourished male in no acute distress  Psychiatric: normal mood, appropriate affect  Eyes: sclera anicteric  HENT: normocephalic/atraumatic  CV: regular rate and rhythm   Respiratory: non labored breathing  Integumentary: no rash  Neurological: moves all extremities    Left hip exam: skin normal, no abrasions, wounds, or lacerations.  Well-healed remote surgical incision posteriorly nttp over greater trochanter. negative log roll, negative yulisa's test. flexion to 90 degrees with pain.  Significant pain with flexion abduction  and external rotation, severe hip and groin pain with flexion adduction and internal rotation. neurovascularly intact distally        Xrays were ordered by me, they were reviewed and independently interpreted by me today, they show severe degenerative disease in the left hip bone-on-bone arthritis, there is a retained rosa in the femoral neck from previous avascular porosis treatment.    Procedures      Impression/Plan: This is a 59 y.o. male with severe end-stage degenerative disease of the left hip that has failed nonoperative management.  Patient like to proceed with left total hip.  Will also require removal of hardware    I had an extensive discussion with the patient regarding her condition and possible treatment options. Nonsurgical treatment for left hip arthritis includes activity modification, weight loss, use of a cane or other assistive device, pain medications and nonsteroidal anti-inflammatory medications, joint injections, and physical therapy. The patient has attempted non-surgical treatment for this condition for greater than 6 months and it has failed. We discussed the risks benefits and alternatives of total hip arthroplasty. Benefits of joint replacement include: Relief of pain, improvement of function, and improved quality of life. Alternatives include observation and watchful waiting, and the nonsurgical modalities noted above.     We discussed this is a conversion surgery because of the patient's previous history and the implanted hardware.  We discussed that this surgery and the need for hardware removal from the bone increases the risks related to surgery and may increase the time and technical demands of surgery.  Patient is aware of this and would still like to proceed.    We discussed the risks of complications as well as the risks of morbidity and mortality related to surgical treatment with total joint replacement. We reviewed the fact that total joint replacement is major elective  surgery with significant associated surgical and procedural risk factors as detailed below.   Risks include: Pain, blood loss, damage to nearby anatomical structures including but not limited to nerves or blood vessels muscles tendons and bone, failure surgery to ameliorate symptoms, persistence of pain surrounding the affected joint, mechanical failure of the prosthesis including but not limited to loosening of the prosthesis from bone ,breakage of the prosthesis and dislocation of the prosthesis, change in length or appearance of a limb, infection possibly necessitating further surgery, removal of the prosthesis, or limb amputation, the need for additional surgery for other reasons, blood clots, amputation, and death. No guarantees were implied or given.  All questions were answered and the patient voiced their understanding.     A complete set of surgical instructions was given to the patient. The patient was educated regarding preoperative nutrition, preparation of their home for postoperative rehabilitation, choosing a care partner, medical and dental clearance, the cessation of medications that can cause bleeding or presenting to risk for infection, chlorhexidine bath, nasal swab and decontamination, post operative follow up, and need for medical equipment. Patient was also educated regarding the possibility of same day surgery and related criteria and protocols. The patient will attend our joint class further education, and thereafter they will return for a preoperative visit. A presurgery education booklet was also given to the patient.     surgical plan: Conversion to left total hip with removal of hardware  implants: [place]  approach: Posterior  DVT ppx aspirin  drugs to stop none  allergy to abx none  post operative abx: ancef +/- vanc (per protocol)  special clearance needed none        BMI Readings from Last 1 Encounters:   02/04/25 31.75 kg/m²      Lab Results   Component Value Date    CREATININE 0.91  "02/26/2024     Tobacco Use: High Risk (2/4/2025)    Patient History     Smoking Tobacco Use: Never     Smokeless Tobacco Use: Current     Passive Exposure: Not on file      Computed MELD 3.0 unavailable. One or more values for this score either were not found within the given timeframe or did not fit some other criterion.  Computed MELD-Na unavailable. One or more values for this score either were not found within the given timeframe or did not fit some other criterion.       No results found for: \"HGBA1C\"  No results found for: \"STAPHMRSASCR\"  "

## 2025-02-05 RX ORDER — BACLOFEN 20 MG/1
TABLET ORAL
Qty: 90 TABLET | Refills: 0 | Status: SHIPPED | OUTPATIENT
Start: 2025-02-05

## 2025-03-13 ENCOUNTER — APPOINTMENT (OUTPATIENT)
Dept: ORTHOPEDIC SURGERY | Facility: CLINIC | Age: 60
End: 2025-03-13
Payer: COMMERCIAL

## 2025-03-17 ENCOUNTER — TELEPHONE (OUTPATIENT)
Dept: GASTROENTEROLOGY | Facility: HOSPITAL | Age: 60
End: 2025-03-17
Payer: COMMERCIAL

## 2025-03-17 DIAGNOSIS — K51.00 ULCERATIVE PANCOLITIS WITHOUT COMPLICATION (MULTI): ICD-10-CM

## 2025-03-17 RX ORDER — FOLIC ACID 1 MG/1
TABLET ORAL
Qty: 90 TABLET | Refills: 2 | Status: SHIPPED | OUTPATIENT
Start: 2025-03-17

## 2025-03-17 RX ORDER — SULFASALAZINE 500 MG/1
1500 TABLET ORAL 2 TIMES DAILY
Qty: 545 TABLET | Refills: 2 | Status: SHIPPED | OUTPATIENT
Start: 2025-03-17

## 2025-03-17 NOTE — TELEPHONE ENCOUNTER
Patient requests refill of sulfasalazine 500 mg and folic acid 1mg. Requests medication is sent to [facility] ([phone]).

## 2025-03-24 ENCOUNTER — APPOINTMENT (OUTPATIENT)
Dept: INFUSION THERAPY | Facility: CLINIC | Age: 60
End: 2025-03-24
Payer: COMMERCIAL

## 2025-03-24 VITALS
TEMPERATURE: 98.1 F | SYSTOLIC BLOOD PRESSURE: 136 MMHG | WEIGHT: 219.91 LBS | BODY MASS INDEX: 32.47 KG/M2 | OXYGEN SATURATION: 93 % | RESPIRATION RATE: 18 BRPM | DIASTOLIC BLOOD PRESSURE: 80 MMHG | HEART RATE: 80 BPM

## 2025-03-24 DIAGNOSIS — K51.011 ULCERATIVE PANCOLITIS WITH RECTAL BLEEDING (MULTI): ICD-10-CM

## 2025-03-24 PROCEDURE — 96413 CHEMO IV INFUSION 1 HR: CPT | Performed by: NURSE PRACTITIONER

## 2025-03-24 PROCEDURE — 96375 TX/PRO/DX INJ NEW DRUG ADDON: CPT | Performed by: NURSE PRACTITIONER

## 2025-03-24 RX ORDER — FAMOTIDINE 10 MG/ML
20 INJECTION, SOLUTION INTRAVENOUS ONCE AS NEEDED
OUTPATIENT
Start: 2025-05-19

## 2025-03-24 RX ORDER — DIPHENHYDRAMINE HCL 25 MG
25 CAPSULE ORAL ONCE
Status: COMPLETED | OUTPATIENT
Start: 2025-03-24 | End: 2025-03-24

## 2025-03-24 RX ORDER — EPINEPHRINE 0.3 MG/.3ML
0.3 INJECTION SUBCUTANEOUS EVERY 5 MIN PRN
OUTPATIENT
Start: 2025-05-19

## 2025-03-24 RX ORDER — DIPHENHYDRAMINE HYDROCHLORIDE 50 MG/ML
50 INJECTION, SOLUTION INTRAMUSCULAR; INTRAVENOUS AS NEEDED
OUTPATIENT
Start: 2025-05-19

## 2025-03-24 RX ORDER — DIPHENHYDRAMINE HCL 25 MG
25 CAPSULE ORAL ONCE
OUTPATIENT
Start: 2025-05-19 | End: 2025-05-19

## 2025-03-24 RX ORDER — ACETAMINOPHEN 325 MG/1
650 TABLET ORAL ONCE
Status: COMPLETED | OUTPATIENT
Start: 2025-03-24 | End: 2025-03-24

## 2025-03-24 RX ORDER — ALBUTEROL SULFATE 0.83 MG/ML
3 SOLUTION RESPIRATORY (INHALATION) AS NEEDED
OUTPATIENT
Start: 2025-05-19

## 2025-03-24 RX ORDER — ACETAMINOPHEN 325 MG/1
650 TABLET ORAL ONCE
OUTPATIENT
Start: 2025-05-19 | End: 2025-05-19

## 2025-03-24 RX ADMIN — Medication 25 MG: at 11:11

## 2025-03-24 RX ADMIN — ACETAMINOPHEN 650 MG: 325 TABLET ORAL at 11:11

## 2025-03-24 ASSESSMENT — ENCOUNTER SYMPTOMS
SORE THROAT: 0
MYALGIAS: 0
COUGH: 0
EXTREMITY WEAKNESS: 0
UNEXPECTED WEIGHT CHANGE: 0
APPETITE CHANGE: 0
FATIGUE: 0
HEADACHES: 0
NUMBNESS: 0
DIZZINESS: 0
EYE PROBLEMS: 0
WOUND: 0
DYSURIA: 0
DIARRHEA: 0
ABDOMINAL PAIN: 0
VOICE CHANGE: 0
WHEEZING: 0
ARTHRALGIAS: 1
NAUSEA: 0
BLOOD IN STOOL: 0
LEG SWELLING: 0
HEMATURIA: 0
SHORTNESS OF BREATH: 0
CHILLS: 0
TROUBLE SWALLOWING: 0
VOMITING: 0
FREQUENCY: 0
FEVER: 0
PALPITATIONS: 0
DEPRESSION: 0
NERVOUS/ANXIOUS: 0
CONSTIPATION: 0
LIGHT-HEADEDNESS: 0

## 2025-03-24 NOTE — PROGRESS NOTES
[facility]   Infusion Clinic Note   Date: 2025   Name: Raleigh Owens  : 1965   MRN: [id number]         Reason for Visit: OP Infusion and Follow-up (PT HERE FOR INFLECTRA 900 MG INFUSION/NEXT APPT: 56 DAYS)         Today: We administered acetaminophen, methylPREDNISolone sod succinate, diphenhydrAMINE, and inFLIXimab-dyyb (Inflectra) 900 mg in sodium chloride 0.9% 250 mL IV.       Ordered By: Sagar Michael MD       For a Diagnosis of: Ulcerative pancolitis with rectal bleeding (Multi)       At today's visit patient accompanied by: Self      Today's Vitals:   Vitals:    25 1104 25 1106 25 1250   BP: (!) 163/94  Comment: NURSE NOTIFIED (!) 152/96  Comment: NURSE NOTIFIED 136/80   Pulse: 108 103 80   Resp: 18  18   Temp:   36.7 °C (98.1 °F)   SpO2: 94%  93%   Weight: 99.7 kg (219 lb 14.5 oz)               Pre - Treatment Checklist:      - Previous reaction to current treatment: no      (Assess patient for the concerns below. Document provider notification as appropriate).  - Active or recent infection with/without current antibiotic use: no  - Recent or planned invasive dental work: no  - Recent or planned surgeries: yes, 4/15 HIP REPLACEMENT SURGERY SCHEDULED. OKAY TO PROCEED PER MARYAM UPTON CNP.  - Recently received or plans to receive vaccinations: no  - Has treatment related toxicities: no  - Any chance may be pregnant:  n/a      Pain: 6, HIP PAIN   - Is the pain different from normal: NO   - Is prescribing Doctor aware:  YES      Labs: N/A      Fall Risk Screening: Poe Fall Risk  History of Falling, Immediate or Within 3 Months: No  Secondary Diagnosis: No  Ambulatory Aid: Walks without aid/bedrest/nurse assist  Intravenous Therapy/Heparin Lock: Yes  Gait/Transferring: Normal/bedrest/immobile  Mental Status: Oriented to own ability  Poe Fall Risk Score: 20       Review Of Systems:  Review of Systems   Constitutional:  Negative for appetite  "change, chills, fatigue, fever and unexpected weight change.   HENT:   Negative for hearing loss, mouth sores, sore throat, tinnitus, trouble swallowing and voice change.    Eyes:  Negative for eye problems.   Respiratory:  Negative for cough, shortness of breath and wheezing.    Cardiovascular:  Negative for chest pain, leg swelling and palpitations.   Gastrointestinal:  Negative for abdominal pain, blood in stool, constipation, diarrhea, nausea and vomiting.   Genitourinary:  Negative for dysuria, frequency and hematuria.    Musculoskeletal:  Positive for arthralgias. Negative for myalgias.        HIP PAIN   Skin:  Negative for itching, rash and wound.   Neurological:  Negative for dizziness, extremity weakness, headaches, light-headedness and numbness.   Psychiatric/Behavioral:  Negative for depression. The patient is not nervous/anxious.          Infusion Readiness:  - Assessment Concerns Related to Infusion: No  - Provider notified: n/a      New Patient Education:    N/A (returning patient for continuation of therapy. Ongoing education provided as needed.)        Treatment Conditions & Drug Specific Questions:    InFLIXimab  (AVSOLA, INFLECTRA, REMICADE, RENFLEXIS)    (Unless otherwise specified on patient specific therapy plan):     TREATMENT CONDITIONS:  Unless otherwise specified on patient specific therapy plan HOLD and notify Provider prior to proceeding with today's infusion if patient with:  o Positive T-Spot  o Reactive Hep B sAg and/or Hep B Core Antibody    Lab Results   Component Value Date    TBSIN Negative 02/15/2023    QFG NEGATIVE 01/25/2018      Lab Results   Component Value Date    HEPBSAG Nonreactive 06/03/2024      No results found for: \"NONUHFIRE\", \"NONUHSWGH\", \"NONUHFISH\", \"EXTHEPBSAG\"  No results found for: \"HBCTI\", \"HEPBCAB\"  Lab Results   Component Value Date    HEPCAB NONREACTIVE 05/10/2022        Patient meets treatment conditions? Yes    DRUG SPECIFIC QUESTIONS:    Any new or " worsening signs / symptoms of heart failure which may include things like worsening shortness of breath, swelling, fatigue? No   (If yes notify provider before proceeding with today's infusion. New onset or worsening HF have been reported with infliximab)    REMINDER:   WEIGHT BASED DRUG     Recommended Vitals/Observation:  Vitals:     Induction: Obtain vital signs every 30 minutes; at end of observation period and as needed.     Maintenance: Obtain vital signs at start and end of infusions  Observation:     Induction: Patient is monitored for 30 minutes post-infusion      Maintenance: No observation.        Weight Based Drug Calculations:    WEIGHT BASED DRUGS: Infliximab (REMICADE, INFLECTRA, RENFLEXIS)   Patient's dosing weight (kg): 98KG     10% weight variance for prescribed treatment: 88.2 kg to 107.8 kg     Patient's weight today:   Vitals:    03/24/25 1104   Weight: 99.7 kg (219 lb 14.5 oz)         weight range for prescribed dose:     Patient weight today falls outside of 10% variance or  weight range: No     Home Care pharmacist informed of weight variance: Not applicable    Doses that are weight based have an acceptable variance rule within 10% of the prescribed   order and/or within  weight range. If patient weight on day of infusion falls   outside of the 10% variance, or weight range, infusion is administered and   pharmacy contacted regarding future dosing adjustments, per policy.      Post Treatment: Patient tolerated treatment without issue and was discharged in no apparent distress.      Note Authored / Patient Cared for By: Bertha Cooney RN

## 2025-03-24 NOTE — PATIENT INSTRUCTIONS
Today :We administered acetaminophen, methylPREDNISolone sod succinate, diphenhydrAMINE, and inFLIXimab-dyyb (Inflectra) 900 mg in sodium chloride 0.9% 250 mL IV.     For:   1. Ulcerative pancolitis with rectal bleeding (Multi)         Your next appointment is due in:  56 DAYS        Please read the  Medication Guide that was given to you and reviewed during todays visit.     (Tell all doctors including dentists that you are taking this medication)     Go to the emergency room or call 911 if:  -You have signs of allergic reaction:   -Rash, hives, itching.   -Swollen, blistered, peeling skin.   -Swelling of face, lips, mouth, tongue or throat.   -Tightness of chest, trouble breathing, swallowing or talking     Call your doctor:  - If IV / injection site gets red, warm, swollen, itchy or leaks fluid or pus.     (Leave dressing on your IV site for at least 2 hours and keep area clean and dry  - If you get sick or have symptoms of infection or are not feeling well for any reason.    (Wash your hands often, stay away from people who are sick)  - If you have side effects from your medication that do not go away or are bothersome.     (Refer to the teaching your nurse gave you for side effects to call your doctor about)    - Common side effects may include:  stuffy nose, headache, feeling tired, muscle aches, upset stomach  - Before receiving any vaccines     - Call the [facility] at   If:  - You get sick, are on antibiotics, have had a recent vaccine, have surgery or dental work and your doctor wants your visit rescheduled.  - You need to cancel and reschedule your visit for any reason. Call at least 2 days before your visit if you need to cancel.   - Your insurance changes before your next visit.    (We will need to get approval from your new insurance. This can take up to two weeks.)     [facility] is opened Monday thru Friday. We are closed on weekends and holidays.   Voice mail  will take your call if the center is closed. If you leave a message please allow 24 hours for a call back during weekdays. If you leave a message on a weekend/holiday, we will call you back the next business day.    A pharmacist is available Monday - Friday from 8:30AM to 3:30PM to help answer any questions you may have about your prescriptions(s). Please call pharmacy at:    [facility]: [phone]  [facility]: [phone]  [facility]: [phone]

## 2025-04-01 ENCOUNTER — PRE-ADMISSION TESTING (OUTPATIENT)
Dept: PREADMISSION TESTING | Facility: HOSPITAL | Age: 60
End: 2025-04-01
Payer: COMMERCIAL

## 2025-04-01 ENCOUNTER — HOSPITAL ENCOUNTER (OUTPATIENT)
Dept: RADIOLOGY | Facility: HOSPITAL | Age: 60
Discharge: HOME | End: 2025-04-01
Payer: COMMERCIAL

## 2025-04-01 VITALS
TEMPERATURE: 97.2 F | DIASTOLIC BLOOD PRESSURE: 94 MMHG | WEIGHT: 217.59 LBS | OXYGEN SATURATION: 94 % | HEART RATE: 90 BPM | RESPIRATION RATE: 18 BRPM | SYSTOLIC BLOOD PRESSURE: 131 MMHG | BODY MASS INDEX: 32.23 KG/M2 | HEIGHT: 69 IN

## 2025-04-01 DIAGNOSIS — M25.552 LEFT HIP PAIN: ICD-10-CM

## 2025-04-01 DIAGNOSIS — Z01.818 PRE-OPERATIVE EXAM: Primary | ICD-10-CM

## 2025-04-01 LAB
ABO GROUP (TYPE) IN BLOOD: NORMAL
ALBUMIN SERPL BCP-MCNC: 4.2 G/DL (ref 3.4–5)
ALP SERPL-CCNC: 198 U/L (ref 33–136)
ALT SERPL W P-5'-P-CCNC: 23 U/L (ref 10–52)
ANION GAP SERPL CALC-SCNC: 12 MMOL/L (ref 10–20)
ANTIBODY SCREEN: NORMAL
AST SERPL W P-5'-P-CCNC: 23 U/L (ref 9–39)
ATRIAL RATE: 88 BPM
BASOPHILS # BLD AUTO: 0.04 X10*3/UL (ref 0–0.1)
BASOPHILS NFR BLD AUTO: 0.7 %
BILIRUB SERPL-MCNC: 0.5 MG/DL (ref 0–1.2)
BUN SERPL-MCNC: 16 MG/DL (ref 6–23)
CALCIUM SERPL-MCNC: 9.4 MG/DL (ref 8.6–10.3)
CHLORIDE SERPL-SCNC: 103 MMOL/L (ref 98–107)
CO2 SERPL-SCNC: 24 MMOL/L (ref 21–32)
CREAT SERPL-MCNC: 0.73 MG/DL (ref 0.5–1.3)
EGFRCR SERPLBLD CKD-EPI 2021: >90 ML/MIN/1.73M*2
EOSINOPHIL # BLD AUTO: 0.09 X10*3/UL (ref 0–0.7)
EOSINOPHIL NFR BLD AUTO: 1.5 %
ERYTHROCYTE [DISTWIDTH] IN BLOOD BY AUTOMATED COUNT: 13.6 % (ref 11.5–14.5)
EST. AVERAGE GLUCOSE BLD GHB EST-MCNC: 91 MG/DL
GLUCOSE SERPL-MCNC: 106 MG/DL (ref 74–99)
HBA1C MFR BLD: 4.8 %
HCT VFR BLD AUTO: 46.3 % (ref 41–52)
HGB BLD-MCNC: 15.5 G/DL (ref 13.5–17.5)
IMM GRANULOCYTES # BLD AUTO: 0.03 X10*3/UL (ref 0–0.7)
IMM GRANULOCYTES NFR BLD AUTO: 0.5 % (ref 0–0.9)
LYMPHOCYTES # BLD AUTO: 1.44 X10*3/UL (ref 1.2–4.8)
LYMPHOCYTES NFR BLD AUTO: 24.5 %
MCH RBC QN AUTO: 34 PG (ref 26–34)
MCHC RBC AUTO-ENTMCNC: 33.5 G/DL (ref 32–36)
MCV RBC AUTO: 102 FL (ref 80–100)
MONOCYTES # BLD AUTO: 0.79 X10*3/UL (ref 0.1–1)
MONOCYTES NFR BLD AUTO: 13.4 %
NEUTROPHILS # BLD AUTO: 3.49 X10*3/UL (ref 1.2–7.7)
NEUTROPHILS NFR BLD AUTO: 59.4 %
NRBC BLD-RTO: 0 /100 WBCS (ref 0–0)
P AXIS: 37 DEGREES
P OFFSET: 182 MS
P ONSET: 126 MS
PLATELET # BLD AUTO: 190 X10*3/UL (ref 150–450)
POTASSIUM SERPL-SCNC: 3.8 MMOL/L (ref 3.5–5.3)
PR INTERVAL: 170 MS
PROT SERPL-MCNC: 7 G/DL (ref 6.4–8.2)
Q ONSET: 211 MS
QRS COUNT: 14 BEATS
QRS DURATION: 88 MS
QT INTERVAL: 378 MS
QTC CALCULATION(BAZETT): 457 MS
QTC FREDERICIA: 429 MS
R AXIS: -20 DEGREES
RBC # BLD AUTO: 4.56 X10*6/UL (ref 4.5–5.9)
RH FACTOR (ANTIGEN D): NORMAL
SODIUM SERPL-SCNC: 135 MMOL/L (ref 136–145)
T AXIS: 35 DEGREES
T OFFSET: 400 MS
VENTRICULAR RATE: 88 BPM
WBC # BLD AUTO: 5.9 X10*3/UL (ref 4.4–11.3)

## 2025-04-01 PROCEDURE — 72100 X-RAY EXAM L-S SPINE 2/3 VWS: CPT

## 2025-04-01 PROCEDURE — 36415 COLL VENOUS BLD VENIPUNCTURE: CPT

## 2025-04-01 PROCEDURE — 84075 ASSAY ALKALINE PHOSPHATASE: CPT

## 2025-04-01 PROCEDURE — 86900 BLOOD TYPING SEROLOGIC ABO: CPT

## 2025-04-01 PROCEDURE — 87081 CULTURE SCREEN ONLY: CPT | Mod: GEALAB

## 2025-04-01 PROCEDURE — 83036 HEMOGLOBIN GLYCOSYLATED A1C: CPT | Mod: GEALAB

## 2025-04-01 PROCEDURE — 85025 COMPLETE CBC W/AUTO DIFF WBC: CPT

## 2025-04-01 PROCEDURE — 93005 ELECTROCARDIOGRAM TRACING: CPT

## 2025-04-01 PROCEDURE — 99204 OFFICE O/P NEW MOD 45 MIN: CPT | Performed by: REGISTERED NURSE

## 2025-04-01 RX ORDER — POTASSIUM CHLORIDE 750 MG/1
10 CAPSULE, EXTENDED RELEASE ORAL DAILY
COMMUNITY

## 2025-04-01 RX ORDER — CHLORHEXIDINE GLUCONATE ORAL RINSE 1.2 MG/ML
15 SOLUTION DENTAL DAILY
Qty: 120 ML | Refills: 0 | Status: SHIPPED | OUTPATIENT
Start: 2025-04-01 | End: 2025-04-03

## 2025-04-01 ASSESSMENT — DUKE ACTIVITY SCORE INDEX (DASI)
CAN YOU WALK INDOORS, SUCH AS AROUND YOUR HOUSE: YES
CAN YOU DO YARD WORK LIKE RAKING LEAVES, WEEDING OR PUSHING A MOWER: YES
CAN YOU HAVE SEXUAL RELATIONS: YES
CAN YOU DO HEAVY WORK AROUND THE HOUSE LIKE SCRUBBING FLOORS OR LIFTING AND MOVING HEAVY FURNITURE: YES
CAN YOU TAKE CARE OF YOURSELF (EAT, DRESS, BATHE, OR USE TOILET): YES
CAN YOU DO LIGHT WORK AROUND THE HOUSE LIKE DUSTING OR WASHING DISHES: YES
CAN YOU PARTICIPATE IN STRENOUS SPORTS LIKE SWIMMING, SINGLES TENNIS, FOOTBALL, BASKETBALL, OR SKIING: NO
CAN YOU WALK A BLOCK OR TWO ON LEVEL GROUND: YES
TOTAL_SCORE: 36.7
CAN YOU RUN A SHORT DISTANCE: NO
CAN YOU PARTICIPATE IN MODERATE RECREATIONAL ACTIVITIES LIKE GOLF, BOWLING, DANCING, DOUBLES TENNIS OR THROWING A BASEBALL OR FOOTBALL: NO
CAN YOU CLIMB A FLIGHT OF STAIRS OR WALK UP A HILL: YES
CAN YOU DO MODERATE WORK AROUND THE HOUSE LIKE VACUUMING, SWEEPING FLOORS OR CARRYING GROCERIES: YES
DASI METS SCORE: 7.3

## 2025-04-01 ASSESSMENT — ENCOUNTER SYMPTOMS
RESPIRATORY NEGATIVE: 1
NEUROLOGICAL NEGATIVE: 1
CONSTITUTIONAL NEGATIVE: 1
ARTHRALGIAS: 1
LIMITED RANGE OF MOTION: 1
CARDIOVASCULAR NEGATIVE: 1
NECK NEGATIVE: 1
GASTROINTESTINAL NEGATIVE: 1
EYES NEGATIVE: 1

## 2025-04-01 ASSESSMENT — LIFESTYLE VARIABLES: SMOKING_STATUS: NONSMOKER

## 2025-04-01 ASSESSMENT — PAIN SCALES - GENERAL: PAINLEVEL_OUTOF10: 6

## 2025-04-01 ASSESSMENT — PAIN - FUNCTIONAL ASSESSMENT: PAIN_FUNCTIONAL_ASSESSMENT: 0-10

## 2025-04-01 NOTE — H&P (VIEW-ONLY)
"CPM/PAT Evaluation       Name: Raleigh Owens (Raleigh Owens \"Harvey\")  /Age: 1965/59 y.o.     In-Person       Chief Complaint: Evaluation prior to surgery    HPI    Past Medical History:   Diagnosis Date    Arthritis     Asthma     DVT (deep venous thrombosis) (Multi)        LLE    GERD (gastroesophageal reflux disease)     Gout     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)     Avascular necrosis of bone of hip    Immunosuppression     Inguinal hernia     Loosening of prosthetic hip     RA (rheumatoid arthritis)     Ulcerative colitis, unspecified, without complications (Multi) 2022    Ulcerative colitis    Vitamin D deficiency        Past Surgical History:   Procedure Laterality Date    BACK SURGERY  01/15/2015    Back Surgery    CARPAL TUNNEL RELEASE Right     HIP SURGERY      L RASHAWN    KNEE SURGERY  01/15/2015    Knee Surgery  scope    OTHER SURGICAL HISTORY  2014    Wrist Surgery Right    OTHER SURGICAL HISTORY  2014    Osteotomies Of The Femoral Shaft With Realignment On Jimi    OTHER SURGICAL HISTORY  03/10/2016    Shoulder Surgery Left    SHOULDER SURGERY  03/10/2016    Shoulder Surgery Right    TOTAL HIP ARTHROPLASTY Right 2014    Total Hip Replacement R       Patient Sexual activity questions deferred to the physician.    Family History   Problem Relation Name Age of Onset    No Known Problems Mother      Prostate cancer Father      Other (teofilo barr) Sister      Diabetes Brother         No Known Allergies    Prior to Admission medications    Medication Sig Start Date End Date Taking? Authorizing Provider   acetaminophen (Tylenol) 325 mg capsule Take 2 capsules (650 mg) by mouth. 3/1/18  Yes Historical Provider, MD   calcium carbonate-vitamin D3 (Calcium 500 + D) 500 mg-5 mcg (200 unit) tablet Take 1 tablet by mouth once daily.   Yes Historical Provider, MD   diclofenac sodium (Voltaren) 1 % gel Apply 4.5 inches (4 g) topically 4 times a day. 25  " Yes Kenan Hinkle MD   esomeprazole (NexIUM) 20 mg DR capsule Take 1 capsule (20 mg) by mouth once daily. 3/1/18  Yes Historical Provider, MD   folic acid (Folvite) 1 mg tablet TAKE 1 TABLET DAILY 3/17/25  Yes Sagar Michael MD   inFLIXimab-dyyb (Inflectra) 100 mg injection Infuse 900 mg into a venous catheter every 8 (eight) weeks. 9/12/24  Yes Sagar Michael MD   multivit-min-FA-lycopen-lutein (ESSENTIAL Man) 0.4-2-250 mg-mg-mcg tablet Take 1 tablet by mouth once daily. 1/15/15  Yes Historical Provider, MD   potassium chloride ER (Micro-K) 10 mEq ER capsule Take 1 capsule (10 mEq) by mouth once daily. Do not crush or chew.   Yes Historical Provider, MD   sulfaSALAzine (Azulfidine) 500 mg tablet Take 3 tablets (1,500 mg) by mouth 2 times a day. 3/17/25  Yes Sagar Michael MD   baclofen (Lioresal) 20 mg tablet Take 1 Tablet orally 3 times per day for muscle spasm.  Patient not taking: Reported on 4/1/2025 2/5/25   Kenan Hinkle MD   chlorhexidine (Peridex) 0.12 % solution Use 15 mL in the mouth or throat once daily for 2 days. Use 15ml once the night before surgery and 15ml once the morning of surgery 4/1/25 4/3/25  Luci Cohen, APRN-CNP   cholecalciferol (Vitamin D-3) 25 MCG (1000 UT) capsule Take 1 capsule (25 mcg) by mouth once daily.  Patient not taking: Reported on 4/1/2025 11/5/13   Historical Provider, MD   ibuprofen 800 mg tablet Take 1 tablet (800 mg) by mouth every 6 hours during the day.  Patient not taking: Reported on 4/1/2025 11/13/22   Historical Provider, MD   valACYclovir (Valtrex) 1 gram tablet TAKE 2 TABLETS BY MOUTH onset pf symptoms, repeat in 12 hours  Patient not taking: Reported on 4/1/2025 3/8/23   Historical Provider, MD FERRER ROS:   Constitutional:   neg    Neuro/Psych:   neg    Eyes:   neg    Ears:   neg    Nose:   Mouth:   neg    Throat:   neg    Neck:   neg    Cardio:   neg    Respiratory:   neg    Endocrine:   GI:   neg    :   neg    Musculoskeletal:    Left hip  "pain 6/10   arthralgias   decreased ROM  Hematologic:   neg    Skin:      Physical Exam  Vitals reviewed.   Constitutional:       Appearance: Normal appearance.   HENT:      Head: Normocephalic and atraumatic.      Nose: Nose normal.      Mouth/Throat:      Mouth: Mucous membranes are moist.      Pharynx: Oropharynx is clear.   Neck:      Vascular: No carotid bruit.   Cardiovascular:      Rate and Rhythm: Normal rate and regular rhythm.      Pulses: Normal pulses.      Heart sounds: Normal heart sounds.   Pulmonary:      Breath sounds: Normal breath sounds.   Abdominal:      Palpations: Abdomen is soft.   Musculoskeletal:         General: Tenderness present.      Cervical back: Normal range of motion and neck supple.      Comments: Left hip   Skin:     General: Skin is warm and dry.      Capillary Refill: Capillary refill takes less than 2 seconds.   Neurological:      General: No focal deficit present.      Mental Status: He is alert and oriented to person, place, and time.   Psychiatric:         Mood and Affect: Mood normal.         Behavior: Behavior normal.         Thought Content: Thought content normal.         Judgment: Judgment normal.          Airway      Visit Vitals  BP (!) 131/94   Pulse 90   Temp 36.2 °C (97.2 °F)   Resp 18   Ht 1.753 m (5' 9\")   Wt 98.7 kg (217 lb 9.5 oz)   SpO2 94%   BMI 32.13 kg/m²   Smoking Status Never   BSA 2.19 m²       DASI Risk Score      Flowsheet Row Pre-Admission Testing from 4/1/2025 in [facility]   Can you take care of yourself (eat, dress, bathe, or use toilet)?  2.75 filed at 04/01/2025 1105   Can you walk indoors, such as around your house? 1.75 filed at 04/01/2025 1105   Can you walk a block or two on level ground?  2.75 filed at 04/01/2025 1105   Can you climb a flight of stairs or walk up a hill? 5.5 filed at 04/01/2025 1105   Can you run a short distance? 0 filed at 04/01/2025 1105   Can you do light work around the house like dusting or washing " dishes? 2.7 filed at 04/01/2025 1105   Can you do moderate work around the house like vacuuming, sweeping floors or carrying groceries? 3.5 filed at 04/01/2025 1105   Can you do heavy work around the house like scrubbing floors or lifting and moving heavy furniture?  8 filed at 04/01/2025 1105   Can you do yard work like raking leaves, weeding or pushing a mower? 4.5 filed at 04/01/2025 1105   Can you have sexual relations? 5.25 filed at 04/01/2025 1105   Can you participate in moderate recreational activities like golf, bowling, dancing, doubles tennis or throwing a baseball or football? 0 filed at 04/01/2025 1105   Can you participate in strenous sports like swimming, singles tennis, football, basketball, or skiing? 0 filed at 04/01/2025 1105   DASI SCORE 36.7 filed at 04/01/2025 1105   METS Score (Will be calculated only when all the questions are answered) 7.3 filed at 04/01/2025 1105          Caprini DVT Assessment      Flowsheet Row Pre-Admission Testing from 4/1/2025 in [facility]   DVT Score (IF A SCORE IS NOT CALCULATING, MUST SELECT A BMI TO COMPLETE) 16 filed at 04/01/2025 1151   Medical Factors History of DVT/PE filed at 04/01/2025 1151   Surgical Factors Elective major lower extremity arthroplasty, Major surgery planned, lasting 2-3 hours filed at 04/01/2025 1151   BMI (BMI MUST BE CHOSEN) 31-40 (Obesity) filed at 04/01/2025 1151          Modified Frailty Index    No data to display       [id number] Stroke Risk Points  Current as of just now        N/A 0 to 9 Points:      Last Change: N/A          The [id number]-[id number] risk score (Lip BEV, et al. 2009. © 2010 [employer]) quantifies the risk of stroke for a patient with atrial fibrillation. For patients without atrial fibrillation or under the age of 18 this score appears as N/A. Higher score values generally indicate higher risk of stroke.        This score is not applicable to this patient. Components are not  calculated.          Revised Cardiac Risk Index      Flowsheet Row Pre-Admission Testing from 4/1/2025 in [facility]   High-Risk Surgery (Intraperitoneal, Intrathoracic,Suprainguinal vascular) 0 filed at 04/01/2025 1208   History of ischemic heart disease (History of MI, History of positive exercuse test, Current chest paint considered due to myocardial ischemia, Use of nitrate therapy, ECG with pathological Q Waves) 0 filed at 04/01/2025 1208   History of congestive heart failure (pulmonary edemia, bilateral rales or S3 gallop, Paroxysmal nocturnal dyspnea, CXR showing pulmonary vascular redistribution) 0 filed at 04/01/2025 1208   History of cerebrovascular disease (Prior TIA or stroke) 0 filed at 04/01/2025 1208   Pre-operative insulin treatment 0 filed at 04/01/2025 1208   Pre-operative creatinine>2 mg/dl 0 filed at 04/01/2025 1208   Revised Cardiac Risk Calculator 0 filed at 04/01/2025 1208          Apfel Simplified Score      Flowsheet Row Pre-Admission Testing from 4/1/2025 in [facility]   Smoking status 1 filed at 04/01/2025 1209   History of motion sickness or PONV  0 filed at 04/01/2025 1209   Use of postoperative opioids 1 filed at 04/01/2025 1209   Gender - Female 0=No filed at 04/01/2025 1209   Apfel Simplified Score Calculator 2 filed at 04/01/2025 1209          Risk Analysis Index Results This Encounter    No data found in the last 10 encounters.       Stop Bang Score      Flowsheet Row Pre-Admission Testing from 4/1/2025 in [facility]   Do you snore loudly? 1 filed at 04/01/2025 1105   Do you often feel tired or fatigued after your sleep? 0 filed at 04/01/2025 1105   Has anyone ever observed you stop breathing in your sleep? 0 filed at 04/01/2025 1105   Do you have or are you being treated for high blood pressure? 0 filed at 04/01/2025 1105   Recent BMI (Calculated) 31.7 filed at 04/01/2025 1105   Is BMI greater than 35 kg/m2? 0=No filed at 04/01/2025 1105    Age older than 50 years old? 1=Yes filed at 04/01/2025 1105   Is your neck circumference greater than 17 inches (Male) or 16 inches (Female)? 1 filed at 04/01/2025 1105   Gender - Male 1=Yes filed at 04/01/2025 1105   STOP-BANG Total Score 4 filed at 04/01/2025 1105          Prodigy: High Risk  Total Score: 16              Prodigy Age Score      Prodigy Gender Score          ARISCAT Score for Postoperative Pulmonary Complications    No data to display       Wei Perioperative Risk for Myocardial Infarction or Cardiac Arrest (KODAK)    No data to display         Results for orders placed or performed in visit on 04/01/25 (from the past 24 hours)   CBC and Auto Differential   Result Value Ref Range    WBC 5.9 4.4 - 11.3 x10*3/uL    nRBC 0.0 0.0 - 0.0 /100 WBCs    RBC 4.56 4.50 - 5.90 x10*6/uL    Hemoglobin 15.5 13.5 - 17.5 g/dL    Hematocrit 46.3 41.0 - 52.0 %     (H) 80 - 100 fL    MCH 34.0 26.0 - 34.0 pg    MCHC 33.5 32.0 - 36.0 g/dL    RDW 13.6 11.5 - 14.5 %    Platelets 190 150 - 450 x10*3/uL    Neutrophils % 59.4 40.0 - 80.0 %    Immature Granulocytes %, Automated 0.5 0.0 - 0.9 %    Lymphocytes % 24.5 13.0 - 44.0 %    Monocytes % 13.4 2.0 - 10.0 %    Eosinophils % 1.5 0.0 - 6.0 %    Basophils % 0.7 0.0 - 2.0 %    Neutrophils Absolute 3.49 1.20 - 7.70 x10*3/uL    Immature Granulocytes Absolute, Automated 0.03 0.00 - 0.70 x10*3/uL    Lymphocytes Absolute 1.44 1.20 - 4.80 x10*3/uL    Monocytes Absolute 0.79 0.10 - 1.00 x10*3/uL    Eosinophils Absolute 0.09 0.00 - 0.70 x10*3/uL    Basophils Absolute 0.04 0.00 - 0.10 x10*3/uL   Comprehensive metabolic panel   Result Value Ref Range    Glucose 106 (H) 74 - 99 mg/dL    Sodium 135 (L) 136 - 145 mmol/L    Potassium 3.8 3.5 - 5.3 mmol/L    Chloride 103 98 - 107 mmol/L    Bicarbonate 24 21 - 32 mmol/L    Anion Gap 12 10 - 20 mmol/L    Urea Nitrogen 16 6 - 23 mg/dL    Creatinine 0.73 0.50 - 1.30 mg/dL    eGFR >90 >60 mL/min/1.73m*2    Calcium 9.4 8.6 - 10.3 mg/dL     Albumin 4.2 3.4 - 5.0 g/dL    Alkaline Phosphatase 198 (H) 33 - 136 U/L    Total Protein 7.0 6.4 - 8.2 g/dL    AST 23 9 - 39 U/L    Bilirubin, Total 0.5 0.0 - 1.2 mg/dL    ALT 23 10 - 52 U/L        Assessment & Plan:    59 y.o.  male  scheduled for REVISION, PARTIAL ARTHROPLASTY, HIP, OPEN - Left  on 4/15/25 with Dr. Hinkle for  Left hip pain .  PMHX includes Ulcerative pancolitis with rectal bleeding, RA, gout, GERD, DVT LLE 2006, asthma.  PAT consulted for perioperative risk stratification and optimization    Neuro:  No neurologic diagnosis, however, the patient is at increased risk for perioperative delirium secondary to  age, type and duration of surgery, Patient instructed on and provided cognitive exercises  Patient is at increased risk for perioperative CVA secondary to  increased age    HEENT:  No HEENT diagnosis or significant findings on chart review or clinical presentation and evaluation. No further preoperative testing/intervention indicated at this time.    Cardiovascular:  No CV diagnosis or significant findings on chart review or clinical presentation and evaluation. No further preoperative testing or intervention is indicated at this time.  METS: 7.3  RCRI: 0 points, 3.9%  risk for postoperative MACE   KODAK: 0.1% risk for 30 day postoperative MACE  EKG - 4/1/25  Normal Sinus Rhythm  Normal ECG  Rate 88    Pulmonary:  Hx asthma, no inhalers  age > 60, duration of surgery > 2 hours  Stop Bang score is 4 placing patient at intermediate risk for NEVAEH  ARISCAT: <26 points, 1.6% risk of in-hospital postoperative pulmonary complication  PRODIGY: High risk for opioid induced respiratory depression    Pulmonary education discussed. Patient provided deep breathing exercises and educational handout.      Urological/Renal  No diagnosis or significant findings on chart review or clinical presentation and evaluation.     Endocrine:  No endocrine diagnosis or significant findings on chart review or clinical  presentation and evaluation. No further testing or intervention is indicated at this time.    Hematologic:  Hx DVT LLE 2006 (was managed with ASA)  Antiplatelet management   The patient is not currently receiving antiplatelet therapy.  Anticoagulation management  The patient is not currently receiving anticoagulation therapy. Patient provided with DVT educational handout.    Caprini Score 16, patient at high risk for perioperative DVT.  Patient provided with VTE education/handout.    Gastrointestinal:   Hx Ulcerative pancolitis with rectal bleeding  OP Infusion (INFLECTRA 900MG EVERY 8 WEEKS), GERD  Eat-10 score 0      Infectious disease:   No infectious diagnosis or significant findings on chart review or clinical presentation and evaluation.   Prescription provided for CHG body wash and dental rinse. CHG use instructions reviewed and provided to patient.  Staph screen collected    Musculoskeletal:    Left hip pain, Avascular necrosis of hip bone, RA, gout  Hx Right RASHAWN 2014, Right shoulder surgery 2016, Left RASHAWN    Anesthesia/Airway:  No anesthesia complications      Medication instructions and NPO guidelines reviewed with the patient.  All questions or concerns discussed and addressed.      Labs and EKG ordered   CBC  CMP  A1C  T&S  MRSA  Peridex

## 2025-04-01 NOTE — CPM/PAT H&P
"CPM/PAT Evaluation       Name: Raleigh Owens (Raleigh Owens \"Harvey\")  /Age: 1965/59 y.o.     In-Person       Chief Complaint: Evaluation prior to surgery    HPI    Past Medical History:   Diagnosis Date    Arthritis     Asthma     DVT (deep venous thrombosis) (Multi)        LLE    GERD (gastroesophageal reflux disease)     Gout     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)     Avascular necrosis of bone of hip    Immunosuppression     Inguinal hernia     Loosening of prosthetic hip     RA (rheumatoid arthritis)     Ulcerative colitis, unspecified, without complications (Multi) 2022    Ulcerative colitis    Vitamin D deficiency        Past Surgical History:   Procedure Laterality Date    BACK SURGERY  01/15/2015    Back Surgery    CARPAL TUNNEL RELEASE Right     HIP SURGERY      L RASHAWN    KNEE SURGERY  01/15/2015    Knee Surgery  scope    OTHER SURGICAL HISTORY  2014    Wrist Surgery Right    OTHER SURGICAL HISTORY  2014    Osteotomies Of The Femoral Shaft With Realignment On Jimi    OTHER SURGICAL HISTORY  03/10/2016    Shoulder Surgery Left    SHOULDER SURGERY  03/10/2016    Shoulder Surgery Right    TOTAL HIP ARTHROPLASTY Right 2014    Total Hip Replacement R       Patient Sexual activity questions deferred to the physician.    Family History   Problem Relation Name Age of Onset    No Known Problems Mother      Prostate cancer Father      Other (teofilo barr) Sister      Diabetes Brother         No Known Allergies    Prior to Admission medications    Medication Sig Start Date End Date Taking? Authorizing Provider   acetaminophen (Tylenol) 325 mg capsule Take 2 capsules (650 mg) by mouth. 3/1/18  Yes Historical Provider, MD   calcium carbonate-vitamin D3 (Calcium 500 + D) 500 mg-5 mcg (200 unit) tablet Take 1 tablet by mouth once daily.   Yes Historical Provider, MD   diclofenac sodium (Voltaren) 1 % gel Apply 4.5 inches (4 g) topically 4 times a day. 25  " Yes Kenan Hinkle MD   esomeprazole (NexIUM) 20 mg DR capsule Take 1 capsule (20 mg) by mouth once daily. 3/1/18  Yes Historical Provider, MD   folic acid (Folvite) 1 mg tablet TAKE 1 TABLET DAILY 3/17/25  Yes Sagar Michael MD   inFLIXimab-dyyb (Inflectra) 100 mg injection Infuse 900 mg into a venous catheter every 8 (eight) weeks. 9/12/24  Yes Sagar Michael MD   multivit-min-FA-lycopen-lutein (ESSENTIAL Man) 0.4-2-250 mg-mg-mcg tablet Take 1 tablet by mouth once daily. 1/15/15  Yes Historical Provider, MD   potassium chloride ER (Micro-K) 10 mEq ER capsule Take 1 capsule (10 mEq) by mouth once daily. Do not crush or chew.   Yes Historical Provider, MD   sulfaSALAzine (Azulfidine) 500 mg tablet Take 3 tablets (1,500 mg) by mouth 2 times a day. 3/17/25  Yes Sagar Michael MD   baclofen (Lioresal) 20 mg tablet Take 1 Tablet orally 3 times per day for muscle spasm.  Patient not taking: Reported on 4/1/2025 2/5/25   Kenan Hinkle MD   chlorhexidine (Peridex) 0.12 % solution Use 15 mL in the mouth or throat once daily for 2 days. Use 15ml once the night before surgery and 15ml once the morning of surgery 4/1/25 4/3/25  Luci Cohen, APRN-CNP   cholecalciferol (Vitamin D-3) 25 MCG (1000 UT) capsule Take 1 capsule (25 mcg) by mouth once daily.  Patient not taking: Reported on 4/1/2025 11/5/13   Historical Provider, MD   ibuprofen 800 mg tablet Take 1 tablet (800 mg) by mouth every 6 hours during the day.  Patient not taking: Reported on 4/1/2025 11/13/22   Historical Provider, MD   valACYclovir (Valtrex) 1 gram tablet TAKE 2 TABLETS BY MOUTH onset pf symptoms, repeat in 12 hours  Patient not taking: Reported on 4/1/2025 3/8/23   Historical Provider, MD FERRER ROS:   Constitutional:   neg    Neuro/Psych:   neg    Eyes:   neg    Ears:   neg    Nose:   Mouth:   neg    Throat:   neg    Neck:   neg    Cardio:   neg    Respiratory:   neg    Endocrine:   GI:   neg    :   neg    Musculoskeletal:    Left hip  "pain 6/10   arthralgias   decreased ROM  Hematologic:   neg    Skin:      Physical Exam  Vitals reviewed.   Constitutional:       Appearance: Normal appearance.   HENT:      Head: Normocephalic and atraumatic.      Nose: Nose normal.      Mouth/Throat:      Mouth: Mucous membranes are moist.      Pharynx: Oropharynx is clear.   Neck:      Vascular: No carotid bruit.   Cardiovascular:      Rate and Rhythm: Normal rate and regular rhythm.      Pulses: Normal pulses.      Heart sounds: Normal heart sounds.   Pulmonary:      Breath sounds: Normal breath sounds.   Abdominal:      Palpations: Abdomen is soft.   Musculoskeletal:         General: Tenderness present.      Cervical back: Normal range of motion and neck supple.      Comments: Left hip   Skin:     General: Skin is warm and dry.      Capillary Refill: Capillary refill takes less than 2 seconds.   Neurological:      General: No focal deficit present.      Mental Status: He is alert and oriented to person, place, and time.   Psychiatric:         Mood and Affect: Mood normal.         Behavior: Behavior normal.         Thought Content: Thought content normal.         Judgment: Judgment normal.          Airway      Visit Vitals  BP (!) 131/94   Pulse 90   Temp 36.2 °C (97.2 °F)   Resp 18   Ht 1.753 m (5' 9\")   Wt 98.7 kg (217 lb 9.5 oz)   SpO2 94%   BMI 32.13 kg/m²   Smoking Status Never   BSA 2.19 m²       DASI Risk Score      Flowsheet Row Pre-Admission Testing from 4/1/2025 in [facility]   Can you take care of yourself (eat, dress, bathe, or use toilet)?  2.75 filed at 04/01/2025 1105   Can you walk indoors, such as around your house? 1.75 filed at 04/01/2025 1105   Can you walk a block or two on level ground?  2.75 filed at 04/01/2025 1105   Can you climb a flight of stairs or walk up a hill? 5.5 filed at 04/01/2025 1105   Can you run a short distance? 0 filed at 04/01/2025 1105   Can you do light work around the house like dusting or washing " dishes? 2.7 filed at 04/01/2025 1105   Can you do moderate work around the house like vacuuming, sweeping floors or carrying groceries? 3.5 filed at 04/01/2025 1105   Can you do heavy work around the house like scrubbing floors or lifting and moving heavy furniture?  8 filed at 04/01/2025 1105   Can you do yard work like raking leaves, weeding or pushing a mower? 4.5 filed at 04/01/2025 1105   Can you have sexual relations? 5.25 filed at 04/01/2025 1105   Can you participate in moderate recreational activities like golf, bowling, dancing, doubles tennis or throwing a baseball or football? 0 filed at 04/01/2025 1105   Can you participate in strenous sports like swimming, singles tennis, football, basketball, or skiing? 0 filed at 04/01/2025 1105   DASI SCORE 36.7 filed at 04/01/2025 1105   METS Score (Will be calculated only when all the questions are answered) 7.3 filed at 04/01/2025 1105          Caprini DVT Assessment      Flowsheet Row Pre-Admission Testing from 4/1/2025 in [facility]   DVT Score (IF A SCORE IS NOT CALCULATING, MUST SELECT A BMI TO COMPLETE) 16 filed at 04/01/2025 1151   Medical Factors History of DVT/PE filed at 04/01/2025 1151   Surgical Factors Elective major lower extremity arthroplasty, Major surgery planned, lasting 2-3 hours filed at 04/01/2025 1151   BMI (BMI MUST BE CHOSEN) 31-40 (Obesity) filed at 04/01/2025 1151          Modified Frailty Index    No data to display       [id number] Stroke Risk Points  Current as of just now        N/A 0 to 9 Points:      Last Change: N/A          The [id number] risk score (Lip BEV, et al. 2009. © 2010 [employer]) quantifies the risk of stroke for a patient with atrial fibrillation. For patients without atrial fibrillation or under the age of 18 this score appears as N/A. Higher score values generally indicate higher risk of stroke.        This score is not applicable to this patient. Components are not  calculated.          Revised Cardiac Risk Index      Flowsheet Row Pre-Admission Testing from 4/1/2025 in [facility]   High-Risk Surgery (Intraperitoneal, Intrathoracic,Suprainguinal vascular) 0 filed at 04/01/2025 1208   History of ischemic heart disease (History of MI, History of positive exercuse test, Current chest paint considered due to myocardial ischemia, Use of nitrate therapy, ECG with pathological Q Waves) 0 filed at 04/01/2025 1208   History of congestive heart failure (pulmonary edemia, bilateral rales or S3 gallop, Paroxysmal nocturnal dyspnea, CXR showing pulmonary vascular redistribution) 0 filed at 04/01/2025 1208   History of cerebrovascular disease (Prior TIA or stroke) 0 filed at 04/01/2025 1208   Pre-operative insulin treatment 0 filed at 04/01/2025 1208   Pre-operative creatinine>2 mg/dl 0 filed at 04/01/2025 1208   Revised Cardiac Risk Calculator 0 filed at 04/01/2025 1208          Apfel Simplified Score      Flowsheet Row Pre-Admission Testing from 4/1/2025 in [facility]   Smoking status 1 filed at 04/01/2025 1209   History of motion sickness or PONV  0 filed at 04/01/2025 1209   Use of postoperative opioids 1 filed at 04/01/2025 1209   Gender - Female 0=No filed at 04/01/2025 1209   Apfel Simplified Score Calculator 2 filed at 04/01/2025 1209          Risk Analysis Index Results This Encounter    No data found in the last 10 encounters.       Stop Bang Score      Flowsheet Row Pre-Admission Testing from 4/1/2025 in [facility]   Do you snore loudly? 1 filed at 04/01/2025 1105   Do you often feel tired or fatigued after your sleep? 0 filed at 04/01/2025 1105   Has anyone ever observed you stop breathing in your sleep? 0 filed at 04/01/2025 1105   Do you have or are you being treated for high blood pressure? 0 filed at 04/01/2025 1105   Recent BMI (Calculated) 31.7 filed at 04/01/2025 1105   Is BMI greater than 35 kg/m2? 0=No filed at 04/01/2025 1105    Age older than 50 years old? 1=Yes filed at 04/01/2025 1105   Is your neck circumference greater than 17 inches (Male) or 16 inches (Female)? 1 filed at 04/01/2025 1105   Gender - Male 1=Yes filed at 04/01/2025 1105   STOP-BANG Total Score 4 filed at 04/01/2025 1105          Prodigy: High Risk  Total Score: 16              Prodigy Age Score      Prodigy Gender Score          ARISCAT Score for Postoperative Pulmonary Complications    No data to display       Wei Perioperative Risk for Myocardial Infarction or Cardiac Arrest (KODAK)    No data to display         Results for orders placed or performed in visit on 04/01/25 (from the past 24 hours)   CBC and Auto Differential   Result Value Ref Range    WBC 5.9 4.4 - 11.3 x10*3/uL    nRBC 0.0 0.0 - 0.0 /100 WBCs    RBC 4.56 4.50 - 5.90 x10*6/uL    Hemoglobin 15.5 13.5 - 17.5 g/dL    Hematocrit 46.3 41.0 - 52.0 %     (H) 80 - 100 fL    MCH 34.0 26.0 - 34.0 pg    MCHC 33.5 32.0 - 36.0 g/dL    RDW 13.6 11.5 - 14.5 %    Platelets 190 150 - 450 x10*3/uL    Neutrophils % 59.4 40.0 - 80.0 %    Immature Granulocytes %, Automated 0.5 0.0 - 0.9 %    Lymphocytes % 24.5 13.0 - 44.0 %    Monocytes % 13.4 2.0 - 10.0 %    Eosinophils % 1.5 0.0 - 6.0 %    Basophils % 0.7 0.0 - 2.0 %    Neutrophils Absolute 3.49 1.20 - 7.70 x10*3/uL    Immature Granulocytes Absolute, Automated 0.03 0.00 - 0.70 x10*3/uL    Lymphocytes Absolute 1.44 1.20 - 4.80 x10*3/uL    Monocytes Absolute 0.79 0.10 - 1.00 x10*3/uL    Eosinophils Absolute 0.09 0.00 - 0.70 x10*3/uL    Basophils Absolute 0.04 0.00 - 0.10 x10*3/uL   Comprehensive metabolic panel   Result Value Ref Range    Glucose 106 (H) 74 - 99 mg/dL    Sodium 135 (L) 136 - 145 mmol/L    Potassium 3.8 3.5 - 5.3 mmol/L    Chloride 103 98 - 107 mmol/L    Bicarbonate 24 21 - 32 mmol/L    Anion Gap 12 10 - 20 mmol/L    Urea Nitrogen 16 6 - 23 mg/dL    Creatinine 0.73 0.50 - 1.30 mg/dL    eGFR >90 >60 mL/min/1.73m*2    Calcium 9.4 8.6 - 10.3 mg/dL     Albumin 4.2 3.4 - 5.0 g/dL    Alkaline Phosphatase 198 (H) 33 - 136 U/L    Total Protein 7.0 6.4 - 8.2 g/dL    AST 23 9 - 39 U/L    Bilirubin, Total 0.5 0.0 - 1.2 mg/dL    ALT 23 10 - 52 U/L        Assessment & Plan:    59 y.o.  male  scheduled for REVISION, PARTIAL ARTHROPLASTY, HIP, OPEN - Left  on 4/15/25 with Dr. Hinkle for  Left hip pain .  PMHX includes Ulcerative pancolitis with rectal bleeding, RA, gout, GERD, DVT LLE 2006, asthma.  PAT consulted for perioperative risk stratification and optimization    Neuro:  No neurologic diagnosis, however, the patient is at increased risk for perioperative delirium secondary to  age, type and duration of surgery, Patient instructed on and provided cognitive exercises  Patient is at increased risk for perioperative CVA secondary to  increased age    HEENT:  No HEENT diagnosis or significant findings on chart review or clinical presentation and evaluation. No further preoperative testing/intervention indicated at this time.    Cardiovascular:  No CV diagnosis or significant findings on chart review or clinical presentation and evaluation. No further preoperative testing or intervention is indicated at this time.  METS: 7.3  RCRI: 0 points, 3.9%  risk for postoperative MACE   KODAK: 0.1% risk for 30 day postoperative MACE  EKG - 4/1/25  Normal Sinus Rhythm  Normal ECG  Rate 88    Pulmonary:  Hx asthma, no inhalers  age > 60, duration of surgery > 2 hours  Stop Bang score is 4 placing patient at intermediate risk for NEVAEH  ARISCAT: <26 points, 1.6% risk of in-hospital postoperative pulmonary complication  PRODIGY: High risk for opioid induced respiratory depression    Pulmonary education discussed. Patient provided deep breathing exercises and educational handout.      Urological/Renal  No diagnosis or significant findings on chart review or clinical presentation and evaluation.     Endocrine:  No endocrine diagnosis or significant findings on chart review or clinical  presentation and evaluation. No further testing or intervention is indicated at this time.    Hematologic:  Hx DVT LLE 2006 (was managed with ASA)  Antiplatelet management   The patient is not currently receiving antiplatelet therapy.  Anticoagulation management  The patient is not currently receiving anticoagulation therapy. Patient provided with DVT educational handout.    Caprini Score 16, patient at high risk for perioperative DVT.  Patient provided with VTE education/handout.    Gastrointestinal:   Hx Ulcerative pancolitis with rectal bleeding  OP Infusion (INFLECTRA 900MG EVERY 8 WEEKS), GERD  Eat-10 score 0      Infectious disease:   No infectious diagnosis or significant findings on chart review or clinical presentation and evaluation.   Prescription provided for CHG body wash and dental rinse. CHG use instructions reviewed and provided to patient.  Staph screen collected    Musculoskeletal:    Left hip pain, Avascular necrosis of hip bone, RA, gout  Hx Right RASHAWN 2014, Right shoulder surgery 2016, Left RASHAWN    Anesthesia/Airway:  No anesthesia complications      Medication instructions and NPO guidelines reviewed with the patient.  All questions or concerns discussed and addressed.      Labs and EKG ordered   CBC  CMP  A1C  T&S  MRSA  Peridex

## 2025-04-01 NOTE — PREPROCEDURE INSTRUCTIONS
Medication List            Accurate as of April 1, 2025 11:32 AM. Always use your most recent med list.                acetaminophen 325 mg capsule  Commonly known as: Tylenol  Medication Adjustments for Surgery: Take/Use as prescribed     baclofen 20 mg tablet  Commonly known as: Lioresal  Take 1 Tablet orally 3 times per day for muscle spasm.  Medication Adjustments for Surgery: Take/Use as prescribed     Calcium 500 + D 500 mg-5 mcg (200 unit) tablet  Generic drug: calcium carbonate-vitamin D3  Additional Medication Adjustments for Surgery: Take last dose 7 days before surgery     chlorhexidine 0.12 % solution  Commonly known as: Peridex  Use 15 mL in the mouth or throat once daily for 2 days. Use 15ml once the night before surgery and 15ml once the morning of surgery  Medication Adjustments for Surgery: Take/Use as prescribed     cholecalciferol 25 MCG (1000 UT) capsule  Commonly known as: Vitamin D-3  Additional Medication Adjustments for Surgery: Take last dose 7 days before surgery     diclofenac sodium 1 % gel  Commonly known as: Voltaren  Apply 4.5 inches (4 g) topically 4 times a day.  Medication Adjustments for Surgery: Do Not take on the morning of surgery     esomeprazole 20 mg DR capsule  Commonly known as: NexIUM  Medication Adjustments for Surgery: Take/Use as prescribed     ESSENTIAL Man 0.4-2-250 mg-mg-mcg tablet  Generic drug: multivitamin-minerals-folic acid-lycopen-lutein  Additional Medication Adjustments for Surgery: Take last dose 7 days before surgery     folic acid 1 mg tablet  Commonly known as: Folvite  TAKE 1 TABLET DAILY  Additional Medication Adjustments for Surgery: Take last dose 7 days before surgery     ibuprofen 800 mg tablet  Additional Medication Adjustments for Surgery: Take last dose 7 days before surgery     inFLIXimab-dyyb 100 mg injection  Commonly known as: Inflectra  Infuse 900 mg into a venous catheter every 8 (eight) weeks.  Notes to patient: Last taken week ago      potassium chloride ER 10 mEq ER capsule  Commonly known as: Micro-K  Medication Adjustments for Surgery: Take/Use as prescribed     sulfaSALAzine 500 mg tablet  Commonly known as: Azulfidine  Take 3 tablets (1,500 mg) by mouth 2 times a day.  Medication Adjustments for Surgery: Take/Use as prescribed     valACYclovir 1 gram tablet  Commonly known as: Valtrex  Medication Adjustments for Surgery: Take/Use as prescribed                                  Thank you for visiting [facility] ([facility]) today for your pre-procedure evaluation, you were seen by     ESTEVAN Lindo  Pre Admission Testing  [facility]  [phone]    This summary includes instructions and information to aid you during your perioperative period.  Please read carefully. If you have any questions about your visit today, please call the number listed above.  If you become ill or have any changes to your health before your surgery, please contact your primary care provider and alert your surgeon.      Preparing for your Surgery       Exercises  Preoperative Deep Breathing Exercises  Why it is important to do deep breathing exercises before my surgery?  Deep breathing exercises strengthen your breathing muscles.  This helps you to recover after your surgery and decreases the chance of breathing complications.  How are the deep breathing exercises done?  Sit straight with your back supported.  Breathe in deeply and slowly through your nose. Your lower rib cage should expand and your abdomen may move forward.  Hold that breath for 3 to 5 seconds.  Breathe out through pursed lips, slowly and completely.  Rest and repeat 10 times every hour while awake.  Rest longer if you become dizzy or lightheaded.       Preoperative Brain Exercises    What are brain exercises?  A brain exercise is any activity that engages your thinking (cognitive) skills.    What types of activities are considered brain exercises?  Jigsaw puzzles,  crossword puzzles, word jumble, memory games, word search, and many more.  Many can be found free online or on your phone via a mobile narendra.    Why should I do brain exercises before my surgery?  More recent research has shown brain exercise before surgery can lower the risk of postoperative delirium (confusion) which can be especially important for older adults.  Patients who did brain exercises for 5 to 10 hours the days before surgery, cut their risk of postoperative delirium in half up to 1 week after surgery.    Sit-to-Stand Exercise    What is the sit-to-stand exercise?  The sit-to-stand exercise strengthens the muscles of your lower body and muscles in the center of your body (core muscles for stability) helping to maintain and improve your strength and mobility.  How do I do the sit-to-stand exercise?  The goal is to do this exercise without using your arms or hands.  If this is too difficult, use your arms and hands or a chair with armrests to help slowly push yourself to the standing position and lower yourself back to the sitting position. As the movement becomes easier use your arms and hands less.    Steps to the sit-to-stand exercise  Sit up tall in a sturdy chair, knees bent, feet flat on the floor shoulder-width apart.  Shift your hips/pelvis forward in the chair to correctly position yourself for the next movement.  Lean forward at your hips.  Stand up straight putting equal weight on both feet.  Check to be sure you are properly aligned with the chair, in a slow controlled movement sit back down.  Repeat this exercise 10-15 times.  If needed you can do it fewer times until your strength improves.  Rest for 1 minute.  Do another 10-15 sit-to-stand exercises.  Try to do this in the morning and evening.        Instructions    Preoperative Fasting Guidelines    Why must I stop eating and drinking near surgery time?  With sedation, food or liquid in your stomach can enter your lungs causing serious  complications  Food can increase nausea and vomiting  When do I need to stop eating and drinking before my surgery?      Do not eat any food after midnight the night before your surgery/procedure. You may have up to 13.5 ounces of clear liquid until TWO hours before your instructed arrival time to the hospital.  This includes water, black tea/coffee, (no milk or cream) apple juice, and electrolyte drinks (Gatorade). You may chew gum until TWO hours before your surgery/procedure            Simple things you can do to help prevent blood clots     Blood clots are blockages that can form in the body's veins. When a blood clot forms in your deep veins, it may be called a deep vein thrombosis, or DVT for short. Blood clots can happen in any part of the body where blood flows, but they are most common in the arms and legs. If a piece of a blood clot breaks free and travels to the lungs, it is called a pulmonary embolus (PE). A PE can be a very serious problem.         Being in the hospital or having surgery can raise your chances of getting a blood clot because you may not be well enough to move around as much as you normally do.         Ways you can help prevent blood clots in the hospital       Wearing SCDs  SCDs stands for Sequential Compression Devices.   SCDs are special sleeves that wrap around your legs. They attach to a pump that fills them with air to gently squeeze your legs every few minutes.  This helps return the blood in your legs to your heart.   SCDs should only be taken off when walking or bathing. SCDs may not be comfortable, but they can help save your life.              Pump SCD leg sleeves  Wearing compression stockings - if your doctor orders them. These special snug-fitting stockings gently squeeze your legs to help blood flow.       Walking. Walking helps move the blood in your legs.   If your doctor says it is ok, try walking the halls at least   5 times a day. Ask us to help you get up, so you  don't fall.      Taking any blood-thinning medicines your doctor orders.              Ways you can help prevent blood clots at home         Wearing compression stockings - if your doctor orders them.   Walking - to help move the blood in your legs.    Taking any blood-thinning medicines your doctor orders.      Signs of a blood clot or PE    Tell your doctor or nurse right away if you have any of the problems listed below.         If you are at home, seek medical care right away. Call 911 for chest pain or problems breathing.            Signs of a blood clot (DVT) - such as pain, swelling, redness, or warmth in your arm or legs.  Signs of a pulmonary embolism (PE) - such as chest pain or feeling short of breath      Tobacco and Alcohol;  Do not drink alcohol or smoke within 24 hours of surgery.  It is best to quit smoking for as long as possible before any surgery or procedure.    Quitting Alcohol; Things to think about: Alcohol use disorder is a health condition that can improve with treatment. Each person is unique. A treatment that is good for one person may not be a good fit for someone else. Simply knowing the different options can be an important first step. Treatment can be inpatient, where you stay at a facility, or outpatient, where you stay at home. Your insurance plan may cover some treatment costs.   Resources:    NIAAA Alcohol Treatment Navigator - from the National [place] on Alcohol Abuse and  Alcoholism. Offers an online tool to help you find the right treatment for you - near you. Go to alcoholtreatment.niaaa.nih.gov.  [facility] National Hotline  - from the Substance Abuse and Mental Health Services Administration. A free, confidential 24-hour treatment referral and information service for anyone facing mental and/or substance use disorders. Go to findtreatment.gov or call 7-234-012-HELP (1416).    Alcoholics Anonymous (AA) - offers group meetings and a 12-step program to anyone who has a drinking  "problem. Go to [employer] or call 471-241-1775    [employer] [facility] 2-1-1 - to find local programs and resources. Call 211 or go to [employer]Bot    Other people you can talk to about treatment options include your primary care doctor, health insurance plan, local health department, or employee assistance program. You can also ask to talk to a hospital .       Other Instructions  Why did I have my nose, under my arms, and groin swabbed? The purpose of the swab is to identify Staphylococcus aureus inside your nose or on your skin.  The swab was sent to the laboratory for culture.  A positive swab/culture for Staphylococcus aureus is called colonization or carriage.     What is Staphylococcus aureus? Staphylococcus aureus, also known as \"staph\", is a germ found on the skin or in the nose of healthy people.  Sometimes Staphylococcus aureus can get into the body and cause an infection.  This can be minor (such as pimples, boils, or other skin problems).  It might also be serious (such as a blood infection, pneumonia, or a surgical site infection).     What is Staphylococcus aureus colonization or carriage? Colonization or carriage means that a person has the germ but is not sick from it.  These bacteria can be spread on the hands or when breathing or sneezing.   How is Staphylococcus aureus spread? It is most often spread by close contact with a person or item that carries it.   What happens if my culture is positive for Staphylococcus aureus? Your doctor/medical team will use this information to guide any antibiotic treatment which may be necessary.  Regardless of the culture results, we will clean the inside of your nose with a betadine swab just before you have your surgery.   Will I get an infection if I have Staphylococcus aureus in my nose or on my skin? Anyone can get an infection with Staphylococcus aureus.  However, the best way to reduce your risk of infection is to follow the instructions provided to you for the " use of your CHG soap and dental rinse.      Body Wash:     What is a home preoperative antibacterial shower? This shower is a way of cleaning the skin with a germ-killing solution before surgery.  The solution contains chlorhexidine, commonly known as CHG.  CHG is a skin cleanser with germ-killing ability.  Let your doctor know if you are allergic to chlorhexidine.   Why do I need to take a preoperative antibacterial shower? Skin is not sterile.  It is best to try to make your skin as free of germs as possible before surgery.  Proper cleansing with a germ-killing soap before surgery can lower the number of germs on your skin.  This helps to reduce the risk of infection at the surgical site.    Following the instructions listed below will help you prepare your skin for surgery.   How do I use the solution? Steps:  Begin using your CHG soap 5 days before your scheduled surgery.      Oral/Dental Rinse:     What is oral/dental rinse?  It is a mouthwash. It is a way of cleaning the mouth with a germ-killing solution before your surgery.  The solution contains chlorhexidine, commonly known as CHG. It is used inside the mouth to kill a bacteria known as Staphylococcus aureus.  Let your doctor know if you are allergic to Chlorhexidine.   Why do I need to use CHG oral/dental rinse? The CHG oral/dental rinse helps to kill a bacteria in your mouth known as Staphylococcus aureus.  This reduces the risk of infection at the surgical site.    Using your CHG oral/dental rinse STEPS: Use your CHG oral/dental rinse after you brush your teeth the night before (at bedtime) and the morning of your surgery.  Follow all directions on your prescription label.    Use the cap on the container to measure 15 ml.  Swish (gargle if you can) the mouthwash in your mouth for at least 30 seconds, (do not swallow) and spit out.  After you use your CHG rinse, do not rinse your mouth with water, drink or eat.    Please refer to the prescription label  for the appropriate time to resume oral intake   What side effects might I have using the CHG oral/dental rinse? CHG rinse will stick to plaque on the teeth.  Brush and floss just before use.  Teeth brushing will help avoid staining of plaque during use.       The Week before Surgery        Seven days before Surgery  Check your PAT medication instructions  Do the exercises provided to you by PAT  Arrange for a responsible, adult licensed  to take you home after surgery and stay with you for 24 hours.  You will not be permitted to drive yourself home if you have received any anesthetic/sedation  Six days before surgery  Check your PAT medication instructions  Do the exercises provided to you by PAT  Start using Chlorhexidene (CHG) body wash if prescribed  Five days before surgery  Check your PAT medication instructions  Do the exercises provided to you by PAT  Continue to use CHG body wash if prescribed  Three days before surgery  Check your PAT medication instructions  Do the exercises provided to you by PAT  Continue to use CHG body wash if prescribed  Two days before surgery  Check your PAT medication instructions  Do the exercises provided to you by PAT  Continue to use CHG body wash if prescribed    The Day before Surgery       Check your PAT medication and all other PAT instructions including when to stop eating and drinking  You will be called with your arrival time for surgery in the late afternoon.  If you do not receive a call please reach out to [facility]-Op. [phone]  Do not smoke or drink 24 hours before surgery  Prepare items to bring with you to the hospital  Shower with your chlorhexidine wash if prescribed  Brush your teeth and use your chlorhexidine dental rinse if prescribed    The Day of Surgery       Check your PAT medication instructions  Ensure you follow the instructions for when to stop eating and drinking  Shower, if prescribed use CHG.  Do not apply any lotions, creams,  moisturizers, perfume or deodorant  Brush your teeth and use your CHG dental rinse if prescribed  Wear loose comfortable clothing  Avoid make-up  Remove  jewelry and piercings, consider professional piercing removal with a plastic spacer if needed  Bring photo ID and Insurance card  Bring an accurate medication list that includes medication dose, frequency and allergies  Bring a copy of your advanced directives (will, health care power of )  Bring any devices and controllers as well as medical devices you have been provided with for surgery (CPAP, slings, braces, etc.)  Dentures, eyeglasses, and contacts will be removed before surgery, please bring cases for contacts or glasses

## 2025-04-03 LAB — STAPHYLOCOCCUS SPEC CULT: NORMAL

## 2025-04-10 ENCOUNTER — ANESTHESIA EVENT (OUTPATIENT)
Dept: OPERATING ROOM | Facility: HOSPITAL | Age: 60
DRG: 467 | End: 2025-04-10
Payer: COMMERCIAL

## 2025-04-14 NOTE — PREADMISSION SCREENING NOTE
Pre Surgery Discharge Planning :    Procedure: L hip revision      Date of procedure: 4/15/2025      Address you will be discharged to:   [address] [place] [id number]     Care Partner:  Girlfriend Elvia Maynard    Current mobility status: no mobility aides used      Stairs into house: 2    Stairs inside house: 0    DME: has crutches and walker      Joint Class: had previous hip replaced      Same Day Surgery: would like to be

## 2025-04-15 ENCOUNTER — APPOINTMENT (OUTPATIENT)
Dept: RADIOLOGY | Facility: HOSPITAL | Age: 60
DRG: 467 | End: 2025-04-15
Payer: COMMERCIAL

## 2025-04-15 ENCOUNTER — ANESTHESIA (OUTPATIENT)
Dept: OPERATING ROOM | Facility: HOSPITAL | Age: 60
DRG: 467 | End: 2025-04-15
Payer: COMMERCIAL

## 2025-04-15 ENCOUNTER — HOSPITAL ENCOUNTER (INPATIENT)
Facility: HOSPITAL | Age: 60
LOS: 1 days | Discharge: HOME HEALTH | DRG: 467 | End: 2025-04-16
Attending: ORTHOPAEDIC SURGERY | Admitting: ORTHOPAEDIC SURGERY
Payer: COMMERCIAL

## 2025-04-15 ENCOUNTER — HOME HEALTH ADMISSION (OUTPATIENT)
Dept: HOME HEALTH SERVICES | Facility: HOME HEALTH | Age: 60
End: 2025-04-15
Payer: COMMERCIAL

## 2025-04-15 DIAGNOSIS — M25.552 LEFT HIP PAIN: ICD-10-CM

## 2025-04-15 DIAGNOSIS — K51.00 ULCERATIVE PANCOLITIS WITHOUT COMPLICATION (MULTI): ICD-10-CM

## 2025-04-15 DIAGNOSIS — K51.919 ULCERATIVE COLITIS WITH COMPLICATION, UNSPECIFIED LOCATION (MULTI): ICD-10-CM

## 2025-04-15 DIAGNOSIS — Z96.642 HISTORY OF REVISION OF TOTAL REPLACEMENT OF LEFT HIP JOINT: Primary | ICD-10-CM

## 2025-04-15 PROCEDURE — 2500000005 HC RX 250 GENERAL PHARMACY W/O HCPCS: Performed by: ORTHOPAEDIC SURGERY

## 2025-04-15 PROCEDURE — A9999 DME SUPPLY OR ACCESSORY, NOS: HCPCS | Performed by: ORTHOPAEDIC SURGERY

## 2025-04-15 PROCEDURE — 2500000004 HC RX 250 GENERAL PHARMACY W/ HCPCS (ALT 636 FOR OP/ED): Performed by: ANESTHESIOLOGY

## 2025-04-15 PROCEDURE — 0SPB0JZ REMOVAL OF SYNTHETIC SUBSTITUTE FROM LEFT HIP JOINT, OPEN APPROACH: ICD-10-PCS | Performed by: ORTHOPAEDIC SURGERY

## 2025-04-15 PROCEDURE — 99222 1ST HOSP IP/OBS MODERATE 55: CPT | Performed by: NURSE PRACTITIONER

## 2025-04-15 PROCEDURE — C1889 IMPLANT/INSERT DEVICE, NOC: HCPCS | Performed by: ORTHOPAEDIC SURGERY

## 2025-04-15 PROCEDURE — 2500000005 HC RX 250 GENERAL PHARMACY W/O HCPCS: Performed by: NURSE PRACTITIONER

## 2025-04-15 PROCEDURE — 27132 TOTAL HIP ARTHROPLASTY: CPT | Performed by: ORTHOPAEDIC SURGERY

## 2025-04-15 PROCEDURE — 3600000004 HC OR TIME - INITIAL BASE CHARGE - PROCEDURE LEVEL FOUR: Performed by: ORTHOPAEDIC SURGERY

## 2025-04-15 PROCEDURE — A27132 PR CONV PREV HIP SURG TO TOT HIP ARTHROPLAS: Performed by: NURSE ANESTHETIST, CERTIFIED REGISTERED

## 2025-04-15 PROCEDURE — 7100000001 HC RECOVERY ROOM TIME - INITIAL BASE CHARGE: Performed by: ORTHOPAEDIC SURGERY

## 2025-04-15 PROCEDURE — 0SRB02Z REPLACEMENT OF LEFT HIP JOINT WITH METAL ON POLYETHYLENE SYNTHETIC SUBSTITUTE, OPEN APPROACH: ICD-10-PCS | Performed by: ORTHOPAEDIC SURGERY

## 2025-04-15 PROCEDURE — 2720000007 HC OR 272 NO HCPCS: Performed by: ORTHOPAEDIC SURGERY

## 2025-04-15 PROCEDURE — 3700000001 HC GENERAL ANESTHESIA TIME - INITIAL BASE CHARGE: Performed by: ORTHOPAEDIC SURGERY

## 2025-04-15 PROCEDURE — 2500000004 HC RX 250 GENERAL PHARMACY W/ HCPCS (ALT 636 FOR OP/ED): Performed by: ORTHOPAEDIC SURGERY

## 2025-04-15 PROCEDURE — 2500000004 HC RX 250 GENERAL PHARMACY W/ HCPCS (ALT 636 FOR OP/ED): Performed by: NURSE PRACTITIONER

## 2025-04-15 PROCEDURE — P9045 ALBUMIN (HUMAN), 5%, 250 ML: HCPCS | Mod: JZ | Performed by: NURSE ANESTHETIST, CERTIFIED REGISTERED

## 2025-04-15 PROCEDURE — C1776 JOINT DEVICE (IMPLANTABLE): HCPCS | Performed by: ORTHOPAEDIC SURGERY

## 2025-04-15 PROCEDURE — 2500000005 HC RX 250 GENERAL PHARMACY W/O HCPCS: Performed by: NURSE ANESTHETIST, CERTIFIED REGISTERED

## 2025-04-15 PROCEDURE — A27132 PR CONV PREV HIP SURG TO TOT HIP ARTHROPLAS: Performed by: ANESTHESIOLOGY

## 2025-04-15 PROCEDURE — 2500000001 HC RX 250 WO HCPCS SELF ADMINISTERED DRUGS (ALT 637 FOR MEDICARE OP): Performed by: NURSE PRACTITIONER

## 2025-04-15 PROCEDURE — 72170 X-RAY EXAM OF PELVIS: CPT | Performed by: RADIOLOGY

## 2025-04-15 PROCEDURE — RXMED WILLOW AMBULATORY MEDICATION CHARGE

## 2025-04-15 PROCEDURE — 97605 NEG PRS WND THER DME<=50SQCM: CPT | Performed by: ORTHOPAEDIC SURGERY

## 2025-04-15 PROCEDURE — 72170 X-RAY EXAM OF PELVIS: CPT

## 2025-04-15 PROCEDURE — 94760 N-INVAS EAR/PLS OXIMETRY 1: CPT

## 2025-04-15 PROCEDURE — 7100000002 HC RECOVERY ROOM TIME - EACH INCREMENTAL 1 MINUTE: Performed by: ORTHOPAEDIC SURGERY

## 2025-04-15 PROCEDURE — 2500000001 HC RX 250 WO HCPCS SELF ADMINISTERED DRUGS (ALT 637 FOR MEDICARE OP): Performed by: ORTHOPAEDIC SURGERY

## 2025-04-15 PROCEDURE — 3700000002 HC GENERAL ANESTHESIA TIME - EACH INCREMENTAL 1 MINUTE: Performed by: ORTHOPAEDIC SURGERY

## 2025-04-15 PROCEDURE — 3600000009 HC OR TIME - EACH INCREMENTAL 1 MINUTE - PROCEDURE LEVEL FOUR: Performed by: ORTHOPAEDIC SURGERY

## 2025-04-15 PROCEDURE — 2500000004 HC RX 250 GENERAL PHARMACY W/ HCPCS (ALT 636 FOR OP/ED): Mod: JZ | Performed by: ANESTHESIOLOGY

## 2025-04-15 PROCEDURE — 2780000003 HC OR 278 NO HCPCS: Performed by: ORTHOPAEDIC SURGERY

## 2025-04-15 PROCEDURE — 2500000004 HC RX 250 GENERAL PHARMACY W/ HCPCS (ALT 636 FOR OP/ED): Performed by: NURSE ANESTHETIST, CERTIFIED REGISTERED

## 2025-04-15 PROCEDURE — 1100000001 HC PRIVATE ROOM DAILY

## 2025-04-15 DEVICE — 127 DEGREE NECK ANGLE HIP STEM
Type: IMPLANTABLE DEVICE | Site: HIP | Status: FUNCTIONAL
Brand: ACCOLADE

## 2025-04-15 DEVICE — RESTORATION ADM/MDM X3 INSERT
Type: IMPLANTABLE DEVICE | Site: HIP | Status: FUNCTIONAL
Brand: RESTORATION ADM/MDM X3 INSERT

## 2025-04-15 DEVICE — CERAMIC V40 FEMORAL HEAD
Type: IMPLANTABLE DEVICE | Site: HIP | Status: FUNCTIONAL
Brand: BIOLOX

## 2025-04-15 DEVICE — TRIDENT II TRITANIUM CLUSTER 56F
Type: IMPLANTABLE DEVICE | Site: HIP | Status: FUNCTIONAL
Brand: TRIDENT II

## 2025-04-15 DEVICE — LINER- CEMENTLESS
Type: IMPLANTABLE DEVICE | Site: HIP | Status: FUNCTIONAL
Brand: MDM

## 2025-04-15 RX ORDER — FENTANYL CITRATE 50 UG/ML
INJECTION, SOLUTION INTRAMUSCULAR; INTRAVENOUS AS NEEDED
Status: DISCONTINUED | OUTPATIENT
Start: 2025-04-15 | End: 2025-04-15

## 2025-04-15 RX ORDER — SULFASALAZINE 500 MG/1
1500 TABLET ORAL 2 TIMES DAILY
Status: CANCELLED
Start: 2025-05-27

## 2025-04-15 RX ORDER — OXYCODONE AND ACETAMINOPHEN 5; 325 MG/1; MG/1
1 TABLET ORAL EVERY 4 HOURS PRN
Qty: 36 TABLET | Refills: 0 | Status: SHIPPED | OUTPATIENT
Start: 2025-04-15 | End: 2025-04-22

## 2025-04-15 RX ORDER — POLYETHYLENE GLYCOL 3350 17 G/17G
17 POWDER, FOR SOLUTION ORAL DAILY
Qty: 238 G | Refills: 0 | Status: SHIPPED | OUTPATIENT
Start: 2025-04-15

## 2025-04-15 RX ORDER — HYDRALAZINE HYDROCHLORIDE 20 MG/ML
10 INJECTION INTRAMUSCULAR; INTRAVENOUS EVERY 6 HOURS PRN
Status: DISCONTINUED | OUTPATIENT
Start: 2025-04-15 | End: 2025-04-16 | Stop reason: HOSPADM

## 2025-04-15 RX ORDER — ASPIRIN 81 MG/1
81 TABLET ORAL 2 TIMES DAILY
Status: DISCONTINUED | OUTPATIENT
Start: 2025-04-16 | End: 2025-04-16 | Stop reason: HOSPADM

## 2025-04-15 RX ORDER — LIDOCAINE HYDROCHLORIDE 20 MG/ML
INJECTION, SOLUTION INFILTRATION; PERINEURAL AS NEEDED
Status: DISCONTINUED | OUTPATIENT
Start: 2025-04-15 | End: 2025-04-15

## 2025-04-15 RX ORDER — ONDANSETRON HYDROCHLORIDE 2 MG/ML
4 INJECTION, SOLUTION INTRAVENOUS EVERY 8 HOURS PRN
Status: DISCONTINUED | OUTPATIENT
Start: 2025-04-15 | End: 2025-04-16 | Stop reason: HOSPADM

## 2025-04-15 RX ORDER — FOLIC ACID 1 MG/1
1 TABLET ORAL DAILY
Status: DISCONTINUED | OUTPATIENT
Start: 2025-04-15 | End: 2025-04-16 | Stop reason: HOSPADM

## 2025-04-15 RX ORDER — ACETAMINOPHEN 325 MG/1
650 TABLET ORAL EVERY 6 HOURS SCHEDULED
Status: DISCONTINUED | OUTPATIENT
Start: 2025-04-15 | End: 2025-04-16 | Stop reason: HOSPADM

## 2025-04-15 RX ORDER — ACETAMINOPHEN 325 MG/1
650 TABLET ORAL EVERY 4 HOURS PRN
Status: DISCONTINUED | OUTPATIENT
Start: 2025-04-15 | End: 2025-04-15 | Stop reason: HOSPADM

## 2025-04-15 RX ORDER — SODIUM CHLORIDE, SODIUM LACTATE, POTASSIUM CHLORIDE, CALCIUM CHLORIDE 600; 310; 30; 20 MG/100ML; MG/100ML; MG/100ML; MG/100ML
100 INJECTION, SOLUTION INTRAVENOUS CONTINUOUS
Status: DISCONTINUED | OUTPATIENT
Start: 2025-04-15 | End: 2025-04-16 | Stop reason: HOSPADM

## 2025-04-15 RX ORDER — DOCUSATE SODIUM 100 MG/1
100 CAPSULE, LIQUID FILLED ORAL 2 TIMES DAILY
Status: DISCONTINUED | OUTPATIENT
Start: 2025-04-15 | End: 2025-04-16 | Stop reason: HOSPADM

## 2025-04-15 RX ORDER — SODIUM CHLORIDE 0.9 G/100ML
INJECTION, SOLUTION IRRIGATION AS NEEDED
Status: DISCONTINUED | OUTPATIENT
Start: 2025-04-15 | End: 2025-04-15 | Stop reason: HOSPADM

## 2025-04-15 RX ORDER — DOCUSATE SODIUM 100 MG/1
100 CAPSULE, LIQUID FILLED ORAL 2 TIMES DAILY
Qty: 20 CAPSULE | Refills: 0 | Status: SHIPPED | OUTPATIENT
Start: 2025-04-15 | End: 2025-04-26

## 2025-04-15 RX ORDER — ONDANSETRON HYDROCHLORIDE 2 MG/ML
8 INJECTION, SOLUTION INTRAVENOUS ONCE
Status: DISCONTINUED | OUTPATIENT
Start: 2025-04-15 | End: 2025-04-15 | Stop reason: HOSPADM

## 2025-04-15 RX ORDER — SODIUM CHLORIDE, SODIUM LACTATE, POTASSIUM CHLORIDE, CALCIUM CHLORIDE 600; 310; 30; 20 MG/100ML; MG/100ML; MG/100ML; MG/100ML
100 INJECTION, SOLUTION INTRAVENOUS CONTINUOUS
Status: DISCONTINUED | OUTPATIENT
Start: 2025-04-15 | End: 2025-04-15 | Stop reason: HOSPADM

## 2025-04-15 RX ORDER — OXYCODONE HYDROCHLORIDE 5 MG/1
10 TABLET ORAL EVERY 4 HOURS PRN
Status: DISCONTINUED | OUTPATIENT
Start: 2025-04-15 | End: 2025-04-16 | Stop reason: HOSPADM

## 2025-04-15 RX ORDER — CELECOXIB 400 MG/1
400 CAPSULE ORAL ONCE
Status: COMPLETED | OUTPATIENT
Start: 2025-04-15 | End: 2025-04-15

## 2025-04-15 RX ORDER — CEFAZOLIN SODIUM 2 G/100ML
2 INJECTION, SOLUTION INTRAVENOUS ONCE
Status: DISCONTINUED | OUTPATIENT
Start: 2025-04-15 | End: 2025-04-15 | Stop reason: HOSPADM

## 2025-04-15 RX ORDER — PANTOPRAZOLE SODIUM 20 MG/1
20 TABLET, DELAYED RELEASE ORAL
Status: DISCONTINUED | OUTPATIENT
Start: 2025-04-16 | End: 2025-04-16 | Stop reason: HOSPADM

## 2025-04-15 RX ORDER — SULFASALAZINE 500 MG/1
1500 TABLET ORAL 2 TIMES DAILY
Start: 2025-05-27

## 2025-04-15 RX ORDER — POTASSIUM CHLORIDE 750 MG/1
10 TABLET, FILM COATED, EXTENDED RELEASE ORAL DAILY
Status: DISCONTINUED | OUTPATIENT
Start: 2025-04-15 | End: 2025-04-16 | Stop reason: HOSPADM

## 2025-04-15 RX ORDER — NALOXONE HYDROCHLORIDE 0.4 MG/ML
0.2 INJECTION, SOLUTION INTRAMUSCULAR; INTRAVENOUS; SUBCUTANEOUS EVERY 5 MIN PRN
Status: DISCONTINUED | OUTPATIENT
Start: 2025-04-15 | End: 2025-04-16 | Stop reason: HOSPADM

## 2025-04-15 RX ORDER — ONDANSETRON 4 MG/1
4 TABLET, FILM COATED ORAL EVERY 8 HOURS PRN
Status: DISCONTINUED | OUTPATIENT
Start: 2025-04-15 | End: 2025-04-16 | Stop reason: HOSPADM

## 2025-04-15 RX ORDER — OXYCODONE HYDROCHLORIDE 5 MG/1
5 TABLET ORAL EVERY 4 HOURS PRN
Status: DISCONTINUED | OUTPATIENT
Start: 2025-04-15 | End: 2025-04-16 | Stop reason: HOSPADM

## 2025-04-15 RX ORDER — TRANEXAMIC ACID 100 MG/ML
INJECTION, SOLUTION INTRAVENOUS AS NEEDED
Status: DISCONTINUED | OUTPATIENT
Start: 2025-04-15 | End: 2025-04-15

## 2025-04-15 RX ORDER — KETOROLAC TROMETHAMINE 30 MG/ML
INJECTION, SOLUTION INTRAMUSCULAR; INTRAVENOUS AS NEEDED
Status: DISCONTINUED | OUTPATIENT
Start: 2025-04-15 | End: 2025-04-15

## 2025-04-15 RX ORDER — NAPROXEN SODIUM 220 MG/1
81 TABLET, FILM COATED ORAL 2 TIMES DAILY
Qty: 56 TABLET | Refills: 0 | Status: SHIPPED | OUTPATIENT
Start: 2025-04-15 | End: 2025-04-16 | Stop reason: HOSPADM

## 2025-04-15 RX ORDER — HYDROMORPHONE HYDROCHLORIDE 2 MG/ML
INJECTION, SOLUTION INTRAMUSCULAR; INTRAVENOUS; SUBCUTANEOUS AS NEEDED
Status: DISCONTINUED | OUTPATIENT
Start: 2025-04-15 | End: 2025-04-15

## 2025-04-15 RX ORDER — ROCURONIUM BROMIDE 10 MG/ML
INJECTION, SOLUTION INTRAVENOUS AS NEEDED
Status: DISCONTINUED | OUTPATIENT
Start: 2025-04-15 | End: 2025-04-15

## 2025-04-15 RX ORDER — CEFADROXIL 500 MG/1
500 CAPSULE ORAL 2 TIMES DAILY
Qty: 14 CAPSULE | Refills: 0 | Status: SHIPPED | OUTPATIENT
Start: 2025-04-15 | End: 2025-04-23

## 2025-04-15 RX ORDER — CEFAZOLIN SODIUM 2 G/100ML
2 INJECTION, SOLUTION INTRAVENOUS EVERY 6 HOURS
Status: COMPLETED | OUTPATIENT
Start: 2025-04-15 | End: 2025-04-16

## 2025-04-15 RX ORDER — MIDAZOLAM HYDROCHLORIDE 1 MG/ML
INJECTION, SOLUTION INTRAMUSCULAR; INTRAVENOUS AS NEEDED
Status: DISCONTINUED | OUTPATIENT
Start: 2025-04-15 | End: 2025-04-15

## 2025-04-15 RX ORDER — ALBUMIN HUMAN 50 G/1000ML
SOLUTION INTRAVENOUS AS NEEDED
Status: DISCONTINUED | OUTPATIENT
Start: 2025-04-15 | End: 2025-04-15

## 2025-04-15 RX ORDER — MORPHINE SULFATE 2 MG/ML
2 INJECTION, SOLUTION INTRAMUSCULAR; INTRAVENOUS EVERY 4 HOURS PRN
Status: DISCONTINUED | OUTPATIENT
Start: 2025-04-15 | End: 2025-04-16 | Stop reason: HOSPADM

## 2025-04-15 RX ORDER — ONDANSETRON HYDROCHLORIDE 2 MG/ML
INJECTION, SOLUTION INTRAVENOUS AS NEEDED
Status: DISCONTINUED | OUTPATIENT
Start: 2025-04-15 | End: 2025-04-15

## 2025-04-15 RX ORDER — POLYETHYLENE GLYCOL 3350 17 G/17G
17 POWDER, FOR SOLUTION ORAL DAILY
Status: DISCONTINUED | OUTPATIENT
Start: 2025-04-15 | End: 2025-04-16 | Stop reason: HOSPADM

## 2025-04-15 RX ORDER — ACETAMINOPHEN 325 MG/1
975 TABLET ORAL ONCE
Status: COMPLETED | OUTPATIENT
Start: 2025-04-15 | End: 2025-04-15

## 2025-04-15 RX ORDER — CEFAZOLIN 1 G/1
INJECTION, POWDER, FOR SOLUTION INTRAVENOUS AS NEEDED
Status: DISCONTINUED | OUTPATIENT
Start: 2025-04-15 | End: 2025-04-15

## 2025-04-15 RX ORDER — SODIUM CHLORIDE, SODIUM LACTATE, POTASSIUM CHLORIDE, CALCIUM CHLORIDE 600; 310; 30; 20 MG/100ML; MG/100ML; MG/100ML; MG/100ML
20 INJECTION, SOLUTION INTRAVENOUS CONTINUOUS
Status: ACTIVE | OUTPATIENT
Start: 2025-04-15 | End: 2025-04-16

## 2025-04-15 RX ORDER — PROPOFOL 10 MG/ML
INJECTION, EMULSION INTRAVENOUS AS NEEDED
Status: DISCONTINUED | OUTPATIENT
Start: 2025-04-15 | End: 2025-04-15

## 2025-04-15 RX ORDER — ALBUTEROL SULFATE 0.83 MG/ML
2.5 SOLUTION RESPIRATORY (INHALATION) ONCE AS NEEDED
Status: DISCONTINUED | OUTPATIENT
Start: 2025-04-15 | End: 2025-04-15 | Stop reason: HOSPADM

## 2025-04-15 RX ADMIN — HYDROMORPHONE HYDROCHLORIDE 0.2 MG: 2 INJECTION, SOLUTION INTRAMUSCULAR; INTRAVENOUS; SUBCUTANEOUS at 14:51

## 2025-04-15 RX ADMIN — FENTANYL CITRATE 50 MCG: 50 INJECTION, SOLUTION INTRAMUSCULAR; INTRAVENOUS at 12:27

## 2025-04-15 RX ADMIN — CELECOXIB 400 MG: 400 CAPSULE ORAL at 10:53

## 2025-04-15 RX ADMIN — MIDAZOLAM 2 MG: 1 INJECTION INTRAMUSCULAR; INTRAVENOUS at 12:22

## 2025-04-15 RX ADMIN — HYDROMORPHONE HYDROCHLORIDE 0.4 MG: 2 INJECTION, SOLUTION INTRAMUSCULAR; INTRAVENOUS; SUBCUTANEOUS at 13:26

## 2025-04-15 RX ADMIN — HYDROMORPHONE HYDROCHLORIDE 0.4 MG: 2 INJECTION, SOLUTION INTRAMUSCULAR; INTRAVENOUS; SUBCUTANEOUS at 14:55

## 2025-04-15 RX ADMIN — ROCURONIUM BROMIDE 20 MG: 10 INJECTION INTRAVENOUS at 14:06

## 2025-04-15 RX ADMIN — HYDROMORPHONE HYDROCHLORIDE 0.2 MG: 2 INJECTION, SOLUTION INTRAMUSCULAR; INTRAVENOUS; SUBCUTANEOUS at 14:57

## 2025-04-15 RX ADMIN — HYDROMORPHONE HYDROCHLORIDE 0.2 MG: 2 INJECTION, SOLUTION INTRAMUSCULAR; INTRAVENOUS; SUBCUTANEOUS at 15:03

## 2025-04-15 RX ADMIN — CEFAZOLIN 2 G: 1 INJECTION, POWDER, FOR SOLUTION INTRAMUSCULAR; INTRAVENOUS at 12:54

## 2025-04-15 RX ADMIN — ONDANSETRON 4 MG: 2 INJECTION, SOLUTION INTRAMUSCULAR; INTRAVENOUS at 14:41

## 2025-04-15 RX ADMIN — HYDROMORPHONE HYDROCHLORIDE 0.2 MG: 2 INJECTION, SOLUTION INTRAMUSCULAR; INTRAVENOUS; SUBCUTANEOUS at 15:15

## 2025-04-15 RX ADMIN — SODIUM CHLORIDE, POTASSIUM CHLORIDE, SODIUM LACTATE AND CALCIUM CHLORIDE 20 ML/HR: 600; 310; 30; 20 INJECTION, SOLUTION INTRAVENOUS at 10:55

## 2025-04-15 RX ADMIN — ROCURONIUM BROMIDE 20 MG: 10 INJECTION INTRAVENOUS at 13:13

## 2025-04-15 RX ADMIN — PROPOFOL 200 MG: 10 INJECTION, EMULSION INTRAVENOUS at 12:51

## 2025-04-15 RX ADMIN — ALBUMIN (HUMAN) 250 ML: 12.5 INJECTION, SOLUTION INTRAVENOUS at 13:07

## 2025-04-15 RX ADMIN — Medication 2 L/MIN: at 21:04

## 2025-04-15 RX ADMIN — POVIDONE-IODINE 1 APPLICATION: 5 SOLUTION TOPICAL at 10:53

## 2025-04-15 RX ADMIN — Medication 2 L/MIN: at 17:31

## 2025-04-15 RX ADMIN — ROCURONIUM BROMIDE 50 MG: 10 INJECTION INTRAVENOUS at 12:51

## 2025-04-15 RX ADMIN — ACETAMINOPHEN 650 MG: 325 TABLET ORAL at 17:51

## 2025-04-15 RX ADMIN — HYDROMORPHONE HYDROCHLORIDE 0.2 MG: 2 INJECTION, SOLUTION INTRAMUSCULAR; INTRAVENOUS; SUBCUTANEOUS at 14:02

## 2025-04-15 RX ADMIN — ACETAMINOPHEN 975 MG: 325 TABLET ORAL at 10:53

## 2025-04-15 RX ADMIN — SUGAMMADEX 50 MG: 100 INJECTION, SOLUTION INTRAVENOUS at 14:59

## 2025-04-15 RX ADMIN — HYDROMORPHONE HYDROCHLORIDE 0.5 MG: 1 INJECTION, SOLUTION INTRAMUSCULAR; INTRAVENOUS; SUBCUTANEOUS at 15:56

## 2025-04-15 RX ADMIN — SUGAMMADEX 50 MG: 100 INJECTION, SOLUTION INTRAVENOUS at 14:55

## 2025-04-15 RX ADMIN — SODIUM CHLORIDE, POTASSIUM CHLORIDE, SODIUM LACTATE AND CALCIUM CHLORIDE: 600; 310; 30; 20 INJECTION, SOLUTION INTRAVENOUS at 12:19

## 2025-04-15 RX ADMIN — TRANEXAMIC ACID 1000 MG: 1 INJECTION, SOLUTION INTRAVENOUS at 12:42

## 2025-04-15 RX ADMIN — SUGAMMADEX 50 MG: 100 INJECTION, SOLUTION INTRAVENOUS at 14:49

## 2025-04-15 RX ADMIN — CEFAZOLIN SODIUM 2 G: 2 INJECTION, SOLUTION INTRAVENOUS at 17:48

## 2025-04-15 RX ADMIN — FENTANYL CITRATE 50 MCG: 50 INJECTION, SOLUTION INTRAMUSCULAR; INTRAVENOUS at 12:52

## 2025-04-15 RX ADMIN — LIDOCAINE HYDROCHLORIDE 100 MG: 20 INJECTION, SOLUTION INFILTRATION; PERINEURAL at 12:51

## 2025-04-15 RX ADMIN — HYDROMORPHONE HYDROCHLORIDE 0.2 MG: 2 INJECTION, SOLUTION INTRAMUSCULAR; INTRAVENOUS; SUBCUTANEOUS at 15:12

## 2025-04-15 RX ADMIN — KETOROLAC TROMETHAMINE 30 MG: 30 INJECTION, SOLUTION INTRAMUSCULAR at 14:43

## 2025-04-15 RX ADMIN — DEXAMETHASONE SODIUM PHOSPHATE 8 MG: 4 INJECTION INTRA-ARTICULAR; INTRALESIONAL; INTRAMUSCULAR; INTRAVENOUS; SOFT TISSUE at 12:43

## 2025-04-15 RX ADMIN — SUGAMMADEX 50 MG: 100 INJECTION, SOLUTION INTRAVENOUS at 14:46

## 2025-04-15 RX ADMIN — ROCURONIUM BROMIDE 10 MG: 10 INJECTION INTRAVENOUS at 13:44

## 2025-04-15 SDOH — SOCIAL STABILITY: SOCIAL INSECURITY: HAVE YOU HAD ANY THOUGHTS OF HARMING ANYONE ELSE?: NO

## 2025-04-15 SDOH — ECONOMIC STABILITY: FOOD INSECURITY: WITHIN THE PAST 12 MONTHS, YOU WORRIED THAT YOUR FOOD WOULD RUN OUT BEFORE YOU GOT THE MONEY TO BUY MORE.: NEVER TRUE

## 2025-04-15 SDOH — ECONOMIC STABILITY: INCOME INSECURITY: IN THE PAST 12 MONTHS HAS THE ELECTRIC, GAS, OIL, OR WATER COMPANY THREATENED TO SHUT OFF SERVICES IN YOUR HOME?: NO

## 2025-04-15 SDOH — ECONOMIC STABILITY: FOOD INSECURITY: WITHIN THE PAST 12 MONTHS, THE FOOD YOU BOUGHT JUST DIDN'T LAST AND YOU DIDN'T HAVE MONEY TO GET MORE.: NEVER TRUE

## 2025-04-15 SDOH — HEALTH STABILITY: MENTAL HEALTH
DO YOU FEEL STRESS - TENSE, RESTLESS, NERVOUS, OR ANXIOUS, OR UNABLE TO SLEEP AT NIGHT BECAUSE YOUR MIND IS TROUBLED ALL THE TIME - THESE DAYS?: NOT AT ALL

## 2025-04-15 SDOH — SOCIAL STABILITY: SOCIAL INSECURITY
WITHIN THE LAST YEAR, HAVE YOU BEEN RAPED OR FORCED TO HAVE ANY KIND OF SEXUAL ACTIVITY BY YOUR PARTNER OR EX-PARTNER?: NO

## 2025-04-15 SDOH — SOCIAL STABILITY: SOCIAL INSECURITY: HAS ANYONE EVER THREATENED TO HURT YOUR FAMILY OR YOUR PETS?: NO

## 2025-04-15 SDOH — HEALTH STABILITY: MENTAL HEALTH: CURRENT SMOKER: 0

## 2025-04-15 SDOH — ECONOMIC STABILITY: HOUSING INSECURITY: AT ANY TIME IN THE PAST 12 MONTHS, WERE YOU HOMELESS OR LIVING IN A SHELTER (INCLUDING NOW)?: NO

## 2025-04-15 SDOH — SOCIAL STABILITY: SOCIAL INSECURITY
WITHIN THE LAST YEAR, HAVE YOU BEEN KICKED, HIT, SLAPPED, OR OTHERWISE PHYSICALLY HURT BY YOUR PARTNER OR EX-PARTNER?: NO

## 2025-04-15 SDOH — SOCIAL STABILITY: SOCIAL INSECURITY: WITHIN THE LAST YEAR, HAVE YOU BEEN AFRAID OF YOUR PARTNER OR EX-PARTNER?: NO

## 2025-04-15 SDOH — SOCIAL STABILITY: SOCIAL INSECURITY: DOES ANYONE TRY TO KEEP YOU FROM HAVING/CONTACTING OTHER FRIENDS OR DOING THINGS OUTSIDE YOUR HOME?: NO

## 2025-04-15 SDOH — ECONOMIC STABILITY: HOUSING INSECURITY: IN THE LAST 12 MONTHS, WAS THERE A TIME WHEN YOU WERE NOT ABLE TO PAY THE MORTGAGE OR RENT ON TIME?: NO

## 2025-04-15 SDOH — SOCIAL STABILITY: SOCIAL INSECURITY: WITHIN THE LAST YEAR, HAVE YOU BEEN HUMILIATED OR EMOTIONALLY ABUSED IN OTHER WAYS BY YOUR PARTNER OR EX-PARTNER?: NO

## 2025-04-15 SDOH — ECONOMIC STABILITY: FOOD INSECURITY: HOW HARD IS IT FOR YOU TO PAY FOR THE VERY BASICS LIKE FOOD, HOUSING, MEDICAL CARE, AND HEATING?: NOT VERY HARD

## 2025-04-15 SDOH — SOCIAL STABILITY: SOCIAL INSECURITY: ABUSE: ADULT

## 2025-04-15 SDOH — SOCIAL STABILITY: SOCIAL INSECURITY: HAVE YOU HAD THOUGHTS OF HARMING ANYONE ELSE?: NO

## 2025-04-15 SDOH — ECONOMIC STABILITY: HOUSING INSECURITY: IN THE PAST 12 MONTHS, HOW MANY TIMES HAVE YOU MOVED WHERE YOU WERE LIVING?: 0

## 2025-04-15 SDOH — SOCIAL STABILITY: SOCIAL INSECURITY: DO YOU FEEL UNSAFE GOING BACK TO THE PLACE WHERE YOU ARE LIVING?: NO

## 2025-04-15 SDOH — SOCIAL STABILITY: SOCIAL INSECURITY: WERE YOU ABLE TO COMPLETE ALL THE BEHAVIORAL HEALTH SCREENINGS?: YES

## 2025-04-15 SDOH — ECONOMIC STABILITY: TRANSPORTATION INSECURITY: IN THE PAST 12 MONTHS, HAS LACK OF TRANSPORTATION KEPT YOU FROM MEDICAL APPOINTMENTS OR FROM GETTING MEDICATIONS?: NO

## 2025-04-15 SDOH — SOCIAL STABILITY: SOCIAL INSECURITY: DO YOU FEEL ANYONE HAS EXPLOITED OR TAKEN ADVANTAGE OF YOU FINANCIALLY OR OF YOUR PERSONAL PROPERTY?: NO

## 2025-04-15 SDOH — SOCIAL STABILITY: SOCIAL INSECURITY: ARE THERE ANY APPARENT SIGNS OF INJURIES/BEHAVIORS THAT COULD BE RELATED TO ABUSE/NEGLECT?: NO

## 2025-04-15 SDOH — SOCIAL STABILITY: SOCIAL INSECURITY: ARE YOU OR HAVE YOU BEEN THREATENED OR ABUSED PHYSICALLY, EMOTIONALLY, OR SEXUALLY BY ANYONE?: NO

## 2025-04-15 ASSESSMENT — ACTIVITIES OF DAILY LIVING (ADL)
BATHING: INDEPENDENT
GROOMING: INDEPENDENT
JUDGMENT_ADEQUATE_SAFELY_COMPLETE_DAILY_ACTIVITIES: YES
TOILETING: INDEPENDENT
DRESSING YOURSELF: INDEPENDENT
ADEQUATE_TO_COMPLETE_ADL: YES
FEEDING YOURSELF: INDEPENDENT
PATIENT'S MEMORY ADEQUATE TO SAFELY COMPLETE DAILY ACTIVITIES?: YES
WALKS IN HOME: INDEPENDENT
LACK_OF_TRANSPORTATION: NO
HEARING - LEFT EAR: FUNCTIONAL
HEARING - RIGHT EAR: FUNCTIONAL

## 2025-04-15 ASSESSMENT — LIFESTYLE VARIABLES
SKIP TO QUESTIONS 9-10: 1
AUDIT-C TOTAL SCORE: 0
HOW OFTEN DO YOU HAVE A DRINK CONTAINING ALCOHOL: NEVER
HOW MANY STANDARD DRINKS CONTAINING ALCOHOL DO YOU HAVE ON A TYPICAL DAY: PATIENT DOES NOT DRINK
HOW OFTEN DO YOU HAVE 6 OR MORE DRINKS ON ONE OCCASION: NEVER
AUDIT-C TOTAL SCORE: 0

## 2025-04-15 ASSESSMENT — PAIN - FUNCTIONAL ASSESSMENT
PAIN_FUNCTIONAL_ASSESSMENT: 0-10
PAIN_FUNCTIONAL_ASSESSMENT: 0-10

## 2025-04-15 ASSESSMENT — PAIN DESCRIPTION - LOCATION: LOCATION: HIP

## 2025-04-15 ASSESSMENT — PAIN SCALES - GENERAL
PAINLEVEL_OUTOF10: 0 - NO PAIN
PAINLEVEL_OUTOF10: 1
PAINLEVEL_OUTOF10: 6
PAINLEVEL_OUTOF10: 0 - NO PAIN
PAINLEVEL_OUTOF10: 2

## 2025-04-15 ASSESSMENT — COGNITIVE AND FUNCTIONAL STATUS - GENERAL
STANDING UP FROM CHAIR USING ARMS: A LITTLE
TURNING FROM BACK TO SIDE WHILE IN FLAT BAD: A LITTLE
MOBILITY SCORE: 24
DRESSING REGULAR LOWER BODY CLOTHING: A LITTLE
PATIENT BASELINE BEDBOUND: NO
TOILETING: A LITTLE
CLIMB 3 TO 5 STEPS WITH RAILING: A LITTLE
DAILY ACTIVITIY SCORE: 22
MOVING TO AND FROM BED TO CHAIR: A LITTLE
WALKING IN HOSPITAL ROOM: A LITTLE
MOBILITY SCORE: 19
DAILY ACTIVITIY SCORE: 24

## 2025-04-15 ASSESSMENT — COLUMBIA-SUICIDE SEVERITY RATING SCALE - C-SSRS
6. HAVE YOU EVER DONE ANYTHING, STARTED TO DO ANYTHING, OR PREPARED TO DO ANYTHING TO END YOUR LIFE?: NO
2. HAVE YOU ACTUALLY HAD ANY THOUGHTS OF KILLING YOURSELF?: NO
1. IN THE PAST MONTH, HAVE YOU WISHED YOU WERE DEAD OR WISHED YOU COULD GO TO SLEEP AND NOT WAKE UP?: NO

## 2025-04-15 ASSESSMENT — PATIENT HEALTH QUESTIONNAIRE - PHQ9
SUM OF ALL RESPONSES TO PHQ9 QUESTIONS 1 & 2: 0
1. LITTLE INTEREST OR PLEASURE IN DOING THINGS: NOT AT ALL
2. FEELING DOWN, DEPRESSED OR HOPELESS: NOT AT ALL

## 2025-04-15 ASSESSMENT — PAIN DESCRIPTION - ORIENTATION: ORIENTATION: LEFT

## 2025-04-15 NOTE — DISCHARGE INSTRUCTIONS
TOTAL HIP AND KNEE REPLACEMENT DISCHARGE INSTRUCTIONS  Kenan Hinkle MD      DRIVING & TRAVEL AFTER SURGERY   Patients should anticipate waiting at least 4-6 weeks before traveling long distances after surgery.  You will need to stop to walk around ever 1 hour during your travel to help with blood clot prevention.  Please call the office or your joint nurse to discuss prior to post-surgical travel.  Patients may not drive until cleared by the joint nurse or the office.    DENTAL PROCEDURES & CLEANINGS  You must wait a minimum of 3 months for elective dental appointments, including routine cleanings or dental work including bridges, crowns, extractions, etc..  For any dental visit - cleaning or dental procedures - patients must take an antibiotic 1 hour before the appointment.  Antibiotics are a lifelong need before dental appointments.  The antibiotic prescribed will be based on each patient's allergies.    WOUND CARE  If you experience continued drainage or bleeding, you may cover with abdominal pads (purchase at local drug store).  Knee replacements should wrap with an ace wrap.  Do not remove surgical dressing, it will be removed when you arrive in clinic for follow up visit.   Mesh dressing under the bandage will remain in place until it peels off on its own.  If it is loose, you may gently remove.  Do not remove if pulling causes resistance against the incision.      PAIN, SWELLING, BRUISING & CLICKING  Pain and swelling are a natural part of your recovery which is considered normal fur up to a year after surgery.  Symptoms may be treated with movement, ice, compression stockings, elevating your leg, and by following the pain medication regimen as prescribed.  Bruising is normal for several weeks after surgery and will run down the leg over time.  You may ice areas that are tender to help with discomfort.  You are required to wear the provided compression stockings, every day, for 4 weeks following surgery.   Remove the stockings at night and place them back on in the morning.  Pain and swelling may temporarily increase with an increase in activity or exercise.  Use ice after activity.  Audible clicking with movement or exercises is considered normal following joint replacement.  You may also feel decreased sensation or numbness near the incision site.  These are usually normal and may or may not fade over time.     PERSONAL HYGIENE  You may shower upon discharging from the hospital.  Soap and water is permitted to run over the surgical dressing, steri-strips and incision.  Do not scrub directly over these items.  DO NOT soak your incision in a bath, hot tub, pool or pond/lake for a minimum of 8 weeks following your surgery.  DO NOT use lotions, creams, ointments on your wound for a minimum of 6 weeks following your surgery. At that time you may use vitamin E to assist with softening of your incision.      RESTARTING HOME ROUTINE - DIET & MEDICATIONS  Post-operative constipation can result due to a combination of inactivity, anesthesia and pain medication. To help prevent this, you should increase your water and fiber intake. Physical activity such as walking will also help stimulate the bowels.   You may resume your normal diet when you discharge home.  Choose foods that help promote good bowel habits and prevent constipation, such as foods high in fiber.  You may restart your home medications the following day after your surgery UNLESS you have been given alternate instructions.  Follow the instructions given to you on your hospital discharge instructions for more information regarding your home medications.      IN-HOME PHYSICAL THERAPY & OUTPATIENT PHYSICAL THERAPY  In-home physical therapy will start 1-2 days after you get home from the hospital.    The home care agency will call within the first 24-48 hours to set up their first visit.  Please do not call your care team to inquire during this timeframe.  Continue  the exercises you were given in the hospital until you have been seen by in-home therapy.  Make sure to provide a phone number with the ability for the home care staff to leave a message if you do not answer your phone.    Outpatient physical therapy following knee replacement surgery should begin 2-3 weeks after surgery.  You should call to schedule this appointment ASAP if not already scheduled before surgery.  Waiting until you are ready for outpatient physical therapy will cause a delay in your care.  You may choose any outpatient physical therapy location.  Call the office for an order if needed.    EMERGENCIES - WHEN TO CONTACT THE SURGEON'S OFFICE IMMEDIATELY  Fever >101 with chills that has been present for at least 48 hours.   Excessive bleeding from incision that will not slow down. A small amount of drainage is normal and expected.  Once pressure is applied and the area is covered, do not continue to check the area regularly.  This will remove pressure and bleeding will continue.  Leave in place for 4-6 hours.  Signs of infection of incision-excessive drainage that is soaking through your dressing (especially if it is pus-like), redness that is spreading out from the edges of your incision, or increased warmth around the area.  Excruciating pain for which the pain medication, taken as instructed, is not helping.  Severe calf pain.  Go directly to the emergency room or call 911, if you are experiencing chest pain or difficulty breathing.    ICE & COLD THERAPY INSTRUCTIONS    To assist with pain control and post-op swelling, you should be using ice regularly throughout recovery, especially for the first 6 weeks, regardless of the cold therapy method you use.      Always make sure there is a layer of protection between the cold pad and your skin.    If you are using ICE PACKS or GEL PACKS, you will need to alternate 20 minutes on, 20 minutes off twice per hour.    If you are using an ICE MACHINE, please  follow the provided ice machine instructions.  These devices differ from ice or ice packs whereas the mechanism circulates water through tubing and a pad to provide longer periods of cold therapy to the desired site.  You can use your cold devices around the clock for optimal comfort.  We recommend using cold therapy after working with therapy or completing exercises on your own.  There is no set schedule in which you must follow while using cold therapy.  Below are a few points to remember when using a cold therapy device:    You do not need to need to use the 20 on, 20 off method.  Detach the pad from the cooler and ambulate at least once every hour.  You can check your skin under the pad at this time.  You may wear the cold therapy device during periods of sleep including overnight.  If you wake up during the night, you can check the skin at this time.  You do not need to wake up specifically to perform skin checks.  Empty the cooler and pad when device is not in use.  Follow 's instructions for cleaning your cold therapy device.      DISCHARGE MEDICATIONS    PAIN MEDICATION    __X__ Oxycodone-acetaminophen  Oxycodone-acetaminophen has been prescribed for post-operative pain control.    These medications may only be refilled if neededONCE every 7 days for a period of up to 6 weeks following surgery.  After 6 weeks, you will transition to acetaminophen and over -the- counter anti-inflammatories such as Ibuprofen, Advil or Aleve in conjunction with ICE/COLD THERAPY.   Side effects may be constipation and nausea, vomiting, sleepiness, dizziness, lightheadedness, headache, blurred vision, dry mouth sweating, itching (if you have itching, over-the -counter Benadryl can be used as needed).  You may NOT operate a motor vehicle while taking these medications or have been cleared by your care team.    Please call the office for a refill request.  The office staff and orthopedic nurses cannot refill  medications; messages should be left directly through the office.  Please do not call multiple times or call other members of the care team for medication needs, this will cause the refill to take longer.    Per State and Institutional policy, pain medications can only be refilled every 7 days for up to six weeks following surgery.   .    ______ Naproxen has been prescribed as an adjunct anti-inflammatory to assist in pain control.    Take one 500 mg tablet twice a day for 4 weeks.  You will not receive refills on this medication.   Side effects may include nausea.  May not be prescribed if you are on a more potent blood thinner than aspirin or have chronic kidney disease.    BLOOD THINNER    __X__ Blood Thinner   A blood thinner has been prescribed to prevent blood clots in your leg or lungs. Take as prescribed on the bottle for 4 weeks. You will not receive a refill on this medication.      STOOL SOFTENERS    __X__ Colace (Docusate Sodium)   Take this medication to help with constipation while using the Oxycodone for pain control.  You will not receive a refill on this medication.  If you have not had a bowel movement for 2 days after surgery if you feel painful constipation, you may try over-the-counter Miralax or a suppository. If you still are not able to have a bowel movement, call our office or discuss with your PCP. If you have preexisting constipation or other GI issues that may predispose you to constipation, please consult with your GI provider or PCP if you have not had a bowel movement for 2 days after surgery.     Antibiotics    __X__ Cefadroxil or doxycycline  May be prescribed if needed for prophylaxis against infection   Take 7 day course of antibiotics until they are all gone  May not be prescribed if you do not meet criteria for elevated infection risk after surgery          You will not receive refills on the following medications:    Naproxen  Colace  Blood Thinner

## 2025-04-15 NOTE — OP NOTE
"CONVERSION PREVIOUS HIP, TOTAL HIP ARTHROPLASTY AND REMOVAL HARDWARE - LEFT (L) Operative Note     Date: 4/15/2025  OR Location: [facility] OR    Name: Raleigh Owens \"Harvey\", : 1965, Age: 60 y.o., MRN: [id number], Sex: male    Diagnosis  Pre-op Diagnosis      * Left hip pain [M25.552] Post-op Diagnosis     * Left hip pain [M25.552]     Procedures  CONVERSION PREVIOUS HIP, TOTAL HIP ARTHROPLASTY AND REMOVAL HARDWARE - LEFT  81218 - [place] CONV PREV HIP TOT HIP ARTHRP W/WO AGRFT/ALGRFT      Surgeons      * Kenan Hinkle - Primary    Resident/Fellow/Other Assistant:  Surgeons and Role:     * Anson Bucio PA-C - [facility] First Assist    Staff:   Circulator: Leobardo MACEDOA: Sudhakar Sosa Person: Cori    Anesthesia Staff: Anesthesiologist: Gaurav Espino MD  CRNA: IRIS Gonsales-CRNA    Procedure Summary  Anesthesia: General  ASA: III  Estimated Blood Loss: 400mL  Intra-op Medications:   Administrations occurring from 1135 to 1450 on 04/15/25:   Medication Name Total Dose   sodium chloride 0.9 % irrigation solution 1,000 mL   EPINEPHrine (Adrenalin) 0.2 mL, ketorolac (Toradol) 30 mg, morphine PF (Duramorph) 1 mg/mL 5 mg, ropivacaine (Naropin) 5 mg/mL (0.5 %) 30 mL in sodium chloride 0.9% 20 mL syringe 56.2 mL   albumin human 5 % 250 mL   ceFAZolin (Ancef) vial 1 g 2 g   dexAMETHasone (Decadron) injection 4 mg/mL 8 mg   fentaNYL (Sublimaze) injection 50 mcg/mL 100 mcg   HYDROmorphone (Dilaudid) injection 2 mg/mL 0.6 mg   ketorolac (Toradol) injection 30 mg 30 mg   lactated Ringer's infusion Cannot be calculated   lidocaine (Xylocaine) injection 2 % 100 mg   midazolam (Versed) injection 1 mg/mL 2 mg   ondansetron (Zofran) 2 mg/mL injection 4 mg   propofol (Diprivan) injection 10 mg/mL 200 mg   rocuronium (ZeMuron) 50 mg/5 mL injection 100 mg   sugammadex (Bridion) 200 mg/2 mL injection 100 mg   tranexamic acid (Cyklokapron) injection 1,000 mg              Anesthesia Record               Intraprocedure I/O " Totals          Intake    Tranexamic Acid 0.00 mL    The total shown is the total volume documented since Anesthesia Start was filed.    Total Intake 0 mL       Output    Urine 125 mL    Est. Blood Loss 400 mL    Total Output 525 mL       Net    Net Volume -525 mL          Specimen:   ID Type Source Tests Collected by Time   1 : LEFT HIP BONE Tissue HIP ARTHROPLASTY LEFT SURGICAL PATHOLOGY EXAM Kenan Hinkle MD 4/15/2025 1435   2 : LEFT HIP HARDWARE Tissue HIP HARDWARE LEFT SURGICAL PATHOLOGY EXAM Kenan Hinkle MD 4/15/2025 1447                 Drains and/or Catheters:   Urethral Catheter Non-latex 16 Fr. (Active)       Tourniquet Times:         Implants:  Implants       Type Name Action Serial No.      Joint SHELL, TRIDENT II, CLUSTERHOLE, 56F - [id number] Implanted      Joint LINER, MDM COCR, 46MM F - [id number] Implanted      Joint INSERT, MDM X3 RESTORATION, 46OD 28ID X 8.9MM - [id number] Implanted      Joint HEAD, FEMUR V40 28MM 0MM BIOLOX DELTA - [id number] Implanted      Joint STEM, FEMUR 127D SZ 9 ACCOLADE II - [id number] Implanted               Findings: Severe degenerative disease of the left hip joint, erosion of osteonecrosis implant through the weightbearing surface of the femoral head    Indications: Harvey Owens is an 60 y.o. male who is having surgery for Left hip pain [M25.552].     The patient was seen in the preoperative area. The risks, benefits, complications, treatment options, non-operative alternatives, expected recovery and outcomes were discussed with the patient. The possibilities of reaction to medication, pulmonary aspiration, injury to surrounding structures, bleeding, recurrent infection, the need for additional procedures, failure to diagnose a condition, and creating a complication requiring transfusion or operation were discussed with the patient. The patient concurred with the proposed plan, giving informed consent.  The site of surgery was properly noted/marked if  necessary per policy. The patient has been actively warmed in preoperative area. Preoperative antibiotics have been ordered and given within 1 hours of incision. Venous thrombosis prophylaxis have been ordered including bilateral sequential compression devices and chemical prophylaxis    Surgical assistants, as listed above, including residents if listed, were involved in the procedure. They are trained and qualified to assist in the procedure. Prior to the procedure they assisted with patient positioning, setup, and surgical skin preparation. During the procedure, they actively assisted Dr. Hinkle in completing the operation safely and expeditiously by helping to provide exposure, retract tissues, maintain hemostasis, closure, and any other necessary technical tasks.     No qualified Orthopedic Resident was available to assist with this procedure.  Therefore, the assistance of PA named above, was required throughout this entire procedure.    This PA is specifically trained and experienced in assisting with this procedure.  During the procedure, he actively assisted Dr. Hinkle in completing the operation safely and expeditiously by helping to provide exposure, maintain hemostasis, and served other technical functions, such as suturing, assisting in drilling, etc.  We will be billing for that PA, as a required First Assistant for this procedure.    Justification for 22 modifier: The osteonecrosis implant from the patient's previous surgery was deeply ingrown into bone on all surfaces, because of this it was exceedingly difficult to remove.  He required cutting up the large and extremely sclerotic femoral head piecemeal.  We then used trephines to completely remove the fully ingrown surface of the osteonecrosis implant.  This quite extensive additional time and effort but joint beyond the usual.  Because that a 22 modifier is justified.    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  Risk benefits and alternatives of conversion to total hip arthroplasty were explained to the patient he signed informed consent    Description of procedure: Patient was brought to the operating room placed the operative table in a supine position all bony prominences were well-padded appropriate preoperative antibiotics were given anesthesia was induced by the anesthesia team patient was prepped and draped in the lateral decubitus position in the usual sterile fashion.  A timeout was performed patient was identified by name and medical record number and the laterality and site of surgery confirmed by all present.  Began by making a incision of the patient's hip in line with his previous incision but extending into a posterior approach to the hip proximally.  We obtain hemostasis with Bovie electrocautery.  We incised the tensor fascia in line with the incision and placed a Charnley retractor for excellent visualization.  We made our posterior approach to the hip taking down the posterior capsule piriformis and short external rotators as 1 single soft tissue sleeve and tagging this with Ethibond suture.  We completely expose the hip and dislocated it.  We marked the appropriate level for our femoral neck osteotomy according to the preoperative plan.  We used a one-sided recent saw to cut the femoral neck up to the point of the implant.  We then cut around the implant circumferentially.  We were unable to remove the head at this point because it was fully ingrown to that implant.  We then proceeded to carefully and sequentially cut off pieces of the femoral head working our way around the osteonecrosis implant.  Once we had remove the majority of the bone off the part of the implant that was in the femoral head we used a trephine to begin coring out the bone around the osteonecrosis implant.  We were able to run an 11 mm  trephine down to the level of the threaded portion of the implant.  We then increased it size to 12 and 13.  At that point we removed a large portion of the implant but it had broken in the middle of the threads.  We were able to get UltraFine around the tip of the implant and remove it, it did come with a very thin piece of cortical bone that was incorporated into it creating a small, about 3 x 3 mm defect in the cortex.  Once the implant was removed, we turned our attention to the acetabular side as usual and a total hip for the acetabular component.  We remove the labrum remnants circumferentially in place periacetabular retractors with excellent visualization.  We began by developing the medial wall with sequential reaming until we had excellent pinch fit between the anterior and posterior wall and appropriate size reamer.  We then impacted the femoral head into position using anatomic landmarks and the antenna device to achieve appropriate abduction and anteversion.  We had excellent stable initial scratch fit.  We then impacted the dual mobility liner into place.  We then turned our attention back to the femoral side.  We remove the lateral bone using a box osteotome and then used a Charnley awl to develop the lateral side.  We began sequentially broaching up until a size 9 which we had excellent longitudinal and rotational stability.  We placed a trial neck and head and reduced the hip.  We took the range of motion and found that we had excellent stability both anteriorly and posteriorly with excellent restoration of the leg length and offset.  We then dislocated the hip removed all the trial components irrigated with normal saline.  We impacted the final femoral stem into place and found that it was in excellent position and had excellent stability.  We cleaned and dried the trunnion and impacted the final femoral head into place using a dual mobility construct.  We then reduced the hip and took the range of  motion once more and noted excellent mechanics and excellent stability anteriorly and posteriorly.  We then irrigated with normal saline.  We irrigated with Betadine solution.  We injected the pericapsular and superficial soft tissues with mixture of Duramorph epinephrine Toradol and ropivacaine.  We closed the posterior capsule using #5 Ethibond 2 sutures through the medius tendon into 1 suture through bone tunnels taking the short external rotators piriformis and posterior capsules 1 single soft tissue sleeve.  We then closed the tensor fascia with multiple #1 Vicryl sutures followed by a running Quill suture.  We closed the subcu with 2-0 Vicryl the skin with 2-0 nylon and staples a Maribel negative pressure wound therapy device was placed.  Patient was woken from anesthesia by the anesthesia team brought to the PACU in stable condition    Postoperative plan: Patient will bear weight as tolerated.  Strict posterior hip precautions.  Antibiotics DVT prophylaxis and standard postoperative supportive care.      Complications:  None; patient tolerated the procedure well.    Disposition: PACU - hemodynamically stable.  Condition: stable         Additional Details: none    Attending Attestation: I was present and scrubbed for the key portions of the procedure.    Kenan Hinkle  Phone Number: [phone]

## 2025-04-15 NOTE — ANESTHESIA PREPROCEDURE EVALUATION
"Patient: Raleigh Owens \"Harvey\"    Procedure Information       Date/Time: 04/15/25 1135    Procedure: REVISION, PARTIAL ARTHROPLASTY, HIP, OPEN (Left: Hip) - posterior approach, getachew with rest mod backup    [facility] / [facility]    Surgeons: Kenan Hinkle MD          Vitals:    04/15/25 1006   BP: (!) 178/99   Pulse: 106   Resp: 16   Temp: 36.6 °C (97.9 °F)   SpO2: 93%       Past Surgical History:   Procedure Laterality Date    BACK SURGERY  01/15/2015    Back Surgery    CARPAL TUNNEL RELEASE Right     HIP SURGERY      L RASHAWN    KNEE SURGERY  01/15/2015    Knee Surgery  scope    OTHER SURGICAL HISTORY  02/11/2014    Wrist Surgery Right    OTHER SURGICAL HISTORY  02/11/2014    Osteotomies Of The Femoral Shaft With Realignment On Jimi    OTHER SURGICAL HISTORY  03/10/2016    Shoulder Surgery Left    SHOULDER SURGERY  03/10/2016    Shoulder Surgery Right    TOTAL HIP ARTHROPLASTY Right 02/11/2014    Total Hip Replacement R     Past Medical History:   Diagnosis Date    Arthritis     Asthma     DVT (deep venous thrombosis) (Multi)     2006   LLE    GERD (gastroesophageal reflux disease)     Gout     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)     Avascular necrosis of bone of hip    Immunosuppression     Inguinal hernia     Loosening of prosthetic hip     RA (rheumatoid arthritis)     Ulcerative colitis, unspecified, without complications (Multi) 04/28/2022    Ulcerative colitis    Vitamin D deficiency        Current Facility-Administered Medications:     ceFAZolin (Ancef) 2 g in dextrose (iso)  mL, 2 g, intravenous, Once, Kenan Hinkle MD    lactated Ringer's infusion, 20 mL/hr, intravenous, Continuous, Gaurav Espino MD, Last Rate: 20 mL/hr at 04/15/25 1055, 20 mL/hr at 04/15/25 1055  Prior to Admission medications    Medication Sig Start Date End Date Taking? Authorizing Provider   acetaminophen (Tylenol) 325 mg capsule Take 2 capsules (650 mg) by mouth. 3/1/18  Yes " Historical Provider, MD   calcium carbonate-vitamin D3 (Calcium 500 + D) 500 mg-5 mcg (200 unit) tablet Take 1 tablet by mouth once daily.   Yes Historical Provider, MD   diclofenac sodium (Voltaren) 1 % gel Apply 4.5 inches (4 g) topically 4 times a day. 2/4/25  Yes Kenan Hinkle MD   esomeprazole (NexIUM) 20 mg DR capsule Take 1 capsule (20 mg) by mouth once daily. 3/1/18  Yes Historical Provider, MD   folic acid (Folvite) 1 mg tablet TAKE 1 TABLET DAILY 3/17/25  Yes Sagar Michael MD   multivit-min-FA-lycopen-lutein (ESSENTIAL Man) 0.4-2-250 mg-mg-mcg tablet Take 1 tablet by mouth once daily. 1/15/15  Yes Historical Provider, MD   potassium chloride ER (Micro-K) 10 mEq ER capsule Take 1 capsule (10 mEq) by mouth once daily. Do not crush or chew.   Yes Historical Provider, MD   sulfaSALAzine (Azulfidine) 500 mg tablet Take 3 tablets (1,500 mg) by mouth 2 times a day. 3/17/25  Yes Sagar Michael MD   baclofen (Lioresal) 20 mg tablet Take 1 Tablet orally 3 times per day for muscle spasm.  Patient not taking: Reported on 4/15/2025 2/5/25   Kenan Hinkle MD   cholecalciferol (Vitamin D-3) 25 MCG (1000 UT) capsule Take 1 capsule (25 mcg) by mouth once daily.  Patient not taking: Reported on 4/15/2025 11/5/13   Historical Provider, MD   ibuprofen 800 mg tablet Take 1 tablet (800 mg) by mouth every 6 hours during the day.  Patient not taking: Reported on 4/1/2025 11/13/22   Historical Provider, MD   inFLIXimab-dyyb (Inflectra) 100 mg injection Infuse 900 mg into a venous catheter every 8 (eight) weeks. 9/12/24   Sagar Michael MD   valACYclovir (Valtrex) 1 gram tablet TAKE 2 TABLETS BY MOUTH onset pf symptoms, repeat in 12 hours  Patient not taking: Reported on 4/15/2025 3/8/23   Historical Provider, MD     No Known Allergies  Social History     Tobacco Use    Smoking status: Never    Smokeless tobacco: Current   Substance Use Topics    Alcohol use: Yes     Alcohol/week: 4.0 standard drinks of alcohol      "Types: 4 Cans of beer per week     Comment: occasionally         Chemistry    Lab Results   Component Value Date/Time     (L) 04/01/2025 1134    K 3.8 04/01/2025 1134     04/01/2025 1134    CO2 24 04/01/2025 1134    BUN 16 04/01/2025 1134    CREATININE 0.73 04/01/2025 1134    Lab Results   Component Value Date/Time    CALCIUM 9.4 04/01/2025 1134    ALKPHOS 198 (H) 04/01/2025 1134    AST 23 04/01/2025 1134    ALT 23 04/01/2025 1134    BILITOT 0.5 04/01/2025 1134          Lab Results   Component Value Date/Time    WBC 5.9 04/01/2025 1134    HGB 15.5 04/01/2025 1134    HCT 46.3 04/01/2025 1134     04/01/2025 1134     No results found for: \"PROTIME\", \"PTT\", \"INR\"  Encounter Date: 04/01/25   ECG 12 lead   Result Value    Ventricular Rate 88    Atrial Rate 88    MA Interval 170    QRS Duration 88    QT Interval 378    QTC Calculation(Bazett) 457    P Axis 37    R Axis -20    T Axis 35    QRS Count 14    Q Onset 211    P Onset 126    P Offset 182    T Offset 400    QTC Fredericia 429    Narrative    Normal sinus rhythm  Normal ECG  When compared with ECG of 09-MAR-2015 09:51,  No significant change was found     No results found for this or any previous visit from the past 1095 days.     Relevant Problems   Pulmonary   (+) Asthma      Neuro   (+) Meralgia paresthetica      GI   (+) Ulcerative pancolitis with rectal bleeding (Multi)      ID   (+) Herpesviral infection, unspecified   (+) Infection   (+) Onychomycosis of toenail   (+) Tinea pedis of both feet       Clinical information reviewed:   Tobacco  Allergies  Meds   Med Hx  Surg Hx   Fam Hx  Soc Hx        NPO Detail:  NPO/Void Status  Carbohydrate Drink Given Prior to Surgery? : N  Date of Last Liquid: 04/14/25  Time of Last Liquid: 1800  Date of Last Solid: 04/14/25  Time of Last Solid: 1800  Last Intake Type: Clear fluids  Time of Last Void: 1030         Physical Exam    Airway  Mallampati: II  TM distance: >3 FB     Cardiovascular - normal " exam     Dental    Pulmonary    Abdominal            Anesthesia Plan    History of general anesthesia?: yes  History of complications of general anesthesia?: no    ASA 3     general     The patient is not a current smoker.  Patient was not previously instructed to abstain from smoking on day of procedure.  Patient did not smoke on day of procedure.  Education provided regarding risk of obstructive sleep apnea.  intravenous induction   Anesthetic plan and risks discussed with patient.    Plan discussed with CRNA.

## 2025-04-15 NOTE — CONSULTS
Inpatient consult to Medicine  Consult performed by: ESTEVAN Conway  Consult ordered by: ESTEVAN Watson  Reason for consult: Medical Management          Reason For Consult  Medical Management    History Of Present Illness  Harvey Owens is a 60 y.o. male with a medical history of RA/OA, avascular necrosis of left hip, and RASHAWN in 2006, who presented to [facility] for a conversion to left total hip with removal of hardware. He reports left hip pain for many years that is progressively getting worse. Surgical course uneventful to this point. EBL 400ml. Consulted for medical management. Seen and examined in his room this evening. Currently is pain-free in left hip. He denies chest pain, breathing difficulties, abdominal pain, N/V/D/C, fever, or chills.       Past Medical History  DVT - LLE (2006)  GERD  Osteoarthritis  Avascular Necrosis of Left Hip  Rheumatoid Arthritis  Ulcerative Colitis  Vitamin D Deficiency  Gout  Rotator Cuff Tear  Shoulder Impingement Syndrome    Surgical History  Right Total Hip Arthroplasty - 2014  Left Total Hip Arthroplasty - 2006  Left Knee Arthroscopy  Bilateral Shoulder Surgeries  Back Surgery  Hernia Repair  Carpal Tunnel Release - Right     Social History  He reports that he has never smoked. He uses smokeless tobacco. He reports current alcohol use of about 4.0 standard drinks of alcohol per week. He reports that he does not use drugs.    Family History  Family History   Problem Relation Name Age of Onset    No Known Problems Mother      Prostate cancer Father      Other (teofilo barr) Sister      Diabetes Brother          Allergies  Patient has no known allergies.    Review of Systems  Constitutional: Denies fever, chills, fatigue, weight loss/gain  HEENT: Denies ear ache, sore throat, nasal drainage  Eyes: Denies blurred or double vision  Respiratory: Denies cough, shortness or breath, wheezing  Cardiovascular: Denies chest pain, palpitations, shortness of breath with  exertion, edema  GI: Denies abdominal pain, nausea, vomiting, diarrhea, constipation, bloody stools  : Denies urinary burning, urgency, frequency, hematuria  Musculoskeletal: See HPI  Endocrine: Denies cold/heat intolerance, excessive thirst, excessive hunger  Neuro: Denies dizziness, lightheadedness, seizures, headaches  Psychiatric: Denies anxiety, depression, self-harm  Skin: Denies wounds, rashes, lesions  Hematologic: Denies easy bruising, easy bleeding, clotting disorder      Physical Exam  Constitutional: A&O x 3; NAD; calm and cooperative  Eyes: EOM's intact  HEENT: Normocephalic, Atraumatic. Oral mucosa moist.   Neck: Supple. No JVD, lymphadenopathy.   Lungs: CTAB with fair air movement. Respirations even and unlabored on supplemental oxygen.   Heart: RRR  Abdomen: Soft, non-tender, non-distended, +BS. Obese.   MS/Extremities: MANRIQUE x 4 with LLE weakness. Left hip VAC dressing is C/D/I. No edema. Peripheral pulses intact bilaterally.   Neuro: A&O x3; no focal deficits; gross motor and sensation intact.   Skin: Warm and dry. No rashes or lesions. Facial erythema.   Psych: Normal affect.       Last Recorded Vitals  BP (!) 168/99 (BP Location: Left arm)   Pulse 86   Temp 35.9 °C (96.6 °F) (Temporal)   Resp 18   Wt 97 kg (213 lb 13.5 oz)   SpO2 92%     Relevant Results  Scheduled medications  acetaminophen, 650 mg, oral, q6h ONUR  [START ON 4/16/2025] aspirin, 81 mg, oral, BID  ceFAZolin, 2 g, intravenous, q6h  docusate sodium, 100 mg, oral, BID  folic acid, 1 mg, oral, Daily  [START ON 4/16/2025] pantoprazole, 20 mg, oral, Daily before breakfast  polyethylene glycol, 17 g, oral, Daily  potassium chloride CR, 10 mEq, oral, Daily      Continuous medications  lactated Ringer's, 20 mL/hr, Last Rate: 20 mL/hr (04/15/25 1227)  lactated Ringer's, 100 mL/hr, Last Rate: 100 mL/hr (04/15/25 1728)  oxygen, 2 L/min      PRN medications  PRN medications: hydrALAZINE, morphine, naloxone, ondansetron **OR** ondansetron,  oxyCODONE, oxyCODONE      Assessment/Plan   60 y.o. male with a medical history of RA/OA, avascular necrosis of left hip, and RASHAWN in 2006, who presented to [facility] for a conversion to left total hip with removal of hardware. Consulted for medical management.     Elevated Blood Pressure  -Denies history of HTN or taking any antihypertensives  -Suspect may be related to anesthesia, pain, hospitalization  -Will add PRN Hydralazine for SBP>160 and monitor trends overnight  -Consider adding agent if SBP remains >160    Left Hip Pain with Avascular Necrosis, OA  -S/P left conversion of RASHAWN with hardware removal by Dr. Hinkle today  -Incisional care, antibiotics, activity restrictions per orthopedics surgery.  -PT/OT to follow. WBAT.   -Medicate for pain  -Maintain bowel regimen  -Advised IS use, mobilization.   -Monitor H&H for ABLA. EBL 400ml. CBC in AM.   -DVT prophylaxis per surgery: ASA 81mg BID  -Wean oxygen as tolerates. Please note, he denies history of asthma.     GERD  -On PPI    Rheumatoid Arthritis  -On Folic acid  -Supportive care    H/O DVT   -Provoked by surgery in 2006  -Considered high risk for DVT and would recommend Lovenox/Apixaban for management  -Will discuss with orthopedics in AM    Disposition  -Plan of care discussed with medicine, Dr. Minaya.   -Discharge per attending.   -> 60 minutes spent in coordination of care, including physical examination, review of chart, and discussion with pertinent staff.      Thank you for the consult. Please call/message via secure chat with medical questions.       IRIS Conway-CNP

## 2025-04-15 NOTE — ANESTHESIA PROCEDURE NOTES
Airway  Date/Time: 4/15/2025 12:48 PM  Urgency: elective    Airway not difficult    Staffing  Performed: CRNA   Authorized by: Gaurav Espino MD    Performed by: PAT Gonsales  Patient location during procedure: OR    Indications and Patient Condition  Indications for airway management: anesthesia  Spontaneous Ventilation: absent  Sedation level: deep  Preoxygenated: yes  Patient position: sniffing  Mask difficulty assessment: 2 - vent by mask + OA or adjuvant +/- NMBA    Final Airway Details  Final airway type: endotracheal airway      Successful airway: ETT  Cuffed: yes   Successful intubation technique: video laryngoscopy  Facilitating devices/methods: intubating stylet  Blade: Smita  Blade size: #4  ETT size (mm): 7.5  Cormack-Lehane Classification: grade I - full view of glottis  Placement verified by: chest auscultation and capnometry   Measured from: lips  ETT to lips (cm): 22  Number of attempts at approach: 1  Number of other approaches attempted: 0

## 2025-04-15 NOTE — ANESTHESIA POSTPROCEDURE EVALUATION
"Patient: Raleigh Owens \"Harvey\"    Procedure Summary       Date: 04/15/25 Room / Location: [facility] / [facility]    Anesthesia Start: 1227 Anesthesia Stop: 1530    Procedure: CONVERSION PREVIOUS HIP, TOTAL HIP ARTHROPLASTY AND REMOVAL HARDWARE - LEFT (Left: Hip) Diagnosis:       Left hip pain      (Left hip pain [M25.552])    Surgeons: Kenan Hinkle MD Responsible Provider: Gaurav Espino MD    Anesthesia Type: general ASA Status: 3            Anesthesia Type: general    Vitals Value Taken Time   /97 04/15/25 1535   Temp 36 °C (96.8 °F) 04/15/25 1530   Pulse 91 04/15/25 1535   Resp 16 04/15/25 1535   SpO2 98 % 04/15/25 1530       Anesthesia Post Evaluation    Patient location during evaluation: PACU  Patient participation: complete - patient participated  Level of consciousness: awake  Pain management: adequate  Multimodal analgesia pain management approach  Airway patency: patent  Two or more strategies used to mitigate risk of obstructive sleep apnea  Cardiovascular status: acceptable  Respiratory status: acceptable  Hydration status: acceptable  Postoperative Nausea and Vomiting: none        No notable events documented.    "

## 2025-04-16 ENCOUNTER — DOCUMENTATION (OUTPATIENT)
Dept: HOME HEALTH SERVICES | Facility: HOME HEALTH | Age: 60
End: 2025-04-16
Payer: COMMERCIAL

## 2025-04-16 ENCOUNTER — PHARMACY VISIT (OUTPATIENT)
Dept: PHARMACY | Facility: CLINIC | Age: 60
End: 2025-04-16
Payer: COMMERCIAL

## 2025-04-16 VITALS
WEIGHT: 213.85 LBS | DIASTOLIC BLOOD PRESSURE: 79 MMHG | SYSTOLIC BLOOD PRESSURE: 121 MMHG | HEIGHT: 70 IN | HEART RATE: 82 BPM | RESPIRATION RATE: 16 BRPM | OXYGEN SATURATION: 91 % | TEMPERATURE: 98.8 F | BODY MASS INDEX: 30.61 KG/M2

## 2025-04-16 LAB
ANION GAP SERPL CALC-SCNC: 11 MMOL/L (ref 10–20)
BUN SERPL-MCNC: 11 MG/DL (ref 6–23)
CALCIUM SERPL-MCNC: 8.1 MG/DL (ref 8.6–10.3)
CHLORIDE SERPL-SCNC: 103 MMOL/L (ref 98–107)
CO2 SERPL-SCNC: 27 MMOL/L (ref 21–32)
CREAT SERPL-MCNC: 0.7 MG/DL (ref 0.5–1.3)
EGFRCR SERPLBLD CKD-EPI 2021: >90 ML/MIN/1.73M*2
ERYTHROCYTE [DISTWIDTH] IN BLOOD BY AUTOMATED COUNT: 13.4 % (ref 11.5–14.5)
GLUCOSE SERPL-MCNC: 99 MG/DL (ref 74–99)
HCT VFR BLD AUTO: 37.9 % (ref 41–52)
HGB BLD-MCNC: 12.4 G/DL (ref 13.5–17.5)
MCH RBC QN AUTO: 34.2 PG (ref 26–34)
MCHC RBC AUTO-ENTMCNC: 32.7 G/DL (ref 32–36)
MCV RBC AUTO: 104 FL (ref 80–100)
NRBC BLD-RTO: 0 /100 WBCS (ref 0–0)
PLATELET # BLD AUTO: 153 X10*3/UL (ref 150–450)
POTASSIUM SERPL-SCNC: 3.7 MMOL/L (ref 3.5–5.3)
RBC # BLD AUTO: 3.63 X10*6/UL (ref 4.5–5.9)
SODIUM SERPL-SCNC: 137 MMOL/L (ref 136–145)
WBC # BLD AUTO: 10.3 X10*3/UL (ref 4.4–11.3)

## 2025-04-16 PROCEDURE — 36415 COLL VENOUS BLD VENIPUNCTURE: CPT | Performed by: NURSE PRACTITIONER

## 2025-04-16 PROCEDURE — 97162 PT EVAL MOD COMPLEX 30 MIN: CPT | Mod: GP

## 2025-04-16 PROCEDURE — 99232 SBSQ HOSP IP/OBS MODERATE 35: CPT | Performed by: NURSE PRACTITIONER

## 2025-04-16 PROCEDURE — 82374 ASSAY BLOOD CARBON DIOXIDE: CPT | Performed by: NURSE PRACTITIONER

## 2025-04-16 PROCEDURE — 2500000002 HC RX 250 W HCPCS SELF ADMINISTERED DRUGS (ALT 637 FOR MEDICARE OP, ALT 636 FOR OP/ED): Performed by: NURSE PRACTITIONER

## 2025-04-16 PROCEDURE — 97110 THERAPEUTIC EXERCISES: CPT | Mod: GP

## 2025-04-16 PROCEDURE — 2500000004 HC RX 250 GENERAL PHARMACY W/ HCPCS (ALT 636 FOR OP/ED): Performed by: NURSE PRACTITIONER

## 2025-04-16 PROCEDURE — 2500000001 HC RX 250 WO HCPCS SELF ADMINISTERED DRUGS (ALT 637 FOR MEDICARE OP): Performed by: NURSE PRACTITIONER

## 2025-04-16 PROCEDURE — 99238 HOSP IP/OBS DSCHRG MGMT 30/<: CPT | Performed by: NURSE PRACTITIONER

## 2025-04-16 PROCEDURE — 94760 N-INVAS EAR/PLS OXIMETRY 1: CPT

## 2025-04-16 PROCEDURE — 85027 COMPLETE CBC AUTOMATED: CPT | Performed by: NURSE PRACTITIONER

## 2025-04-16 PROCEDURE — 2500000005 HC RX 250 GENERAL PHARMACY W/O HCPCS: Performed by: NURSE PRACTITIONER

## 2025-04-16 PROCEDURE — RXMED WILLOW AMBULATORY MEDICATION CHARGE

## 2025-04-16 RX ADMIN — PANTOPRAZOLE SODIUM 20 MG: 20 TABLET, DELAYED RELEASE ORAL at 06:18

## 2025-04-16 RX ADMIN — POTASSIUM CHLORIDE 10 MEQ: 750 TABLET, EXTENDED RELEASE ORAL at 08:14

## 2025-04-16 RX ADMIN — CEFAZOLIN SODIUM 2 G: 2 INJECTION, SOLUTION INTRAVENOUS at 00:00

## 2025-04-16 RX ADMIN — OXYCODONE HYDROCHLORIDE 5 MG: 5 TABLET ORAL at 08:08

## 2025-04-16 RX ADMIN — ASPIRIN 81 MG: 81 TABLET, COATED ORAL at 08:07

## 2025-04-16 RX ADMIN — ACETAMINOPHEN 650 MG: 325 TABLET ORAL at 00:00

## 2025-04-16 RX ADMIN — DOCUSATE SODIUM 100 MG: 100 CAPSULE, LIQUID FILLED ORAL at 08:07

## 2025-04-16 RX ADMIN — FOLIC ACID 1 MG: 1 TABLET ORAL at 08:07

## 2025-04-16 RX ADMIN — Medication 21 PERCENT: at 09:48

## 2025-04-16 RX ADMIN — ACETAMINOPHEN 650 MG: 325 TABLET ORAL at 06:18

## 2025-04-16 RX ADMIN — POLYETHYLENE GLYCOL 3350 17 G: 17 POWDER, FOR SOLUTION ORAL at 08:07

## 2025-04-16 RX ADMIN — SODIUM CHLORIDE, SODIUM LACTATE, POTASSIUM CHLORIDE, AND CALCIUM CHLORIDE 100 ML/HR: .6; .31; .03; .02 INJECTION, SOLUTION INTRAVENOUS at 03:55

## 2025-04-16 ASSESSMENT — COGNITIVE AND FUNCTIONAL STATUS - GENERAL
MOBILITY SCORE: 19
CLIMB 3 TO 5 STEPS WITH RAILING: A LITTLE
DRESSING REGULAR LOWER BODY CLOTHING: A LITTLE
CLIMB 3 TO 5 STEPS WITH RAILING: A LITTLE
MOVING TO AND FROM BED TO CHAIR: A LITTLE
MOVING TO AND FROM BED TO CHAIR: A LITTLE
WALKING IN HOSPITAL ROOM: A LITTLE
WALKING IN HOSPITAL ROOM: A LITTLE
STANDING UP FROM CHAIR USING ARMS: A LITTLE
STANDING UP FROM CHAIR USING ARMS: A LITTLE
MOBILITY SCORE: 20
TOILETING: A LITTLE
DAILY ACTIVITIY SCORE: 22
TURNING FROM BACK TO SIDE WHILE IN FLAT BAD: A LITTLE

## 2025-04-16 ASSESSMENT — PAIN - FUNCTIONAL ASSESSMENT: PAIN_FUNCTIONAL_ASSESSMENT: 0-10

## 2025-04-16 ASSESSMENT — ACTIVITIES OF DAILY LIVING (ADL): LACK_OF_TRANSPORTATION: NO

## 2025-04-16 ASSESSMENT — PAIN DESCRIPTION - LOCATION: LOCATION: HIP

## 2025-04-16 ASSESSMENT — PAIN SCALES - GENERAL: PAINLEVEL_OUTOF10: 5 - MODERATE PAIN

## 2025-04-16 ASSESSMENT — PAIN DESCRIPTION - ORIENTATION: ORIENTATION: LEFT

## 2025-04-16 NOTE — DISCHARGE SUMMARY
Discharge Diagnosis  Left hip pain    Issues Requiring Follow-Up  Post surgical follow up     Test Results Pending At Discharge  Pending Labs       Order Current Status    Surgical Pathology Exam Collected (04/15/25 1490)            Hospital Course  The patient is a pleasant 60 year old male who underwent an elective left total hip revision arthroplasty performed by Dr. Hinkle at [facility] on 4/15/25. Patient had a routine uncomplicated preoperative, intraoperative and immediate postoperative surgical course. As planned postoperatively the patient was admitted to the regular nursing floor at [facility]. While in the regular nursing floor patient received consultations from both internal medicine as well as physical therapy. Patient received preoperative and postoperative intravenous antibiotics. Pain control is with a combination of both oral and IV narcotic and nonnarcotic medications. DVT prophylaxis was with sequentials early ambulation and aspirin therapy. Anticoagulation medications will be continued for minimum of 30 days postsurgery. Patient was discharged home with home physical therapy and home health care outpatient follow-up is being arranged for 2 weeks in the outpatient clinic postdischarge.      Pertinent Physical Exam At Time of Discharge  Physical Exam  Vitals reviewed.   Constitutional:       Appearance: Normal appearance.   HENT:      Head: Normocephalic and atraumatic.   Eyes:      Conjunctiva/sclera: Conjunctivae normal.      Pupils: Pupils are equal, round, and reactive to light.   Cardiovascular:      Rate and Rhythm: Normal rate and regular rhythm.      Pulses: Normal pulses.   Pulmonary:      Effort: Pulmonary effort is normal.   Abdominal:      Palpations: Abdomen is soft.   Musculoskeletal:      Cervical back: Neck supple.      Comments: Left hip prevena VAC in place to continuous suction - no output.  Thigh and calf supple, negative Yris's, leg and foot warm and  This encounter was created in error - please disregard.   well perfused.  Quad recruitment strength 4/5, SILT S/S/SPN/DPN distributions, no foot drop, 5/5 dorsi and plantarflexion strength.     Skin:     General: Skin is warm and dry.      Capillary Refill: Capillary refill takes less than 2 seconds.   Neurological:      General: No focal deficit present.      Mental Status: He is alert and oriented to person, place, and time.   Psychiatric:         Mood and Affect: Mood normal.         Behavior: Behavior normal.         Thought Content: Thought content normal.         Judgment: Judgment normal.         Home Medications     Medication List      START taking these medications     aspirin 81 mg chewable tablet; Chew 1 tablet (81 mg) 2 times a day for   28 days.   cefadroxil 500 mg capsule; Commonly known as: Duricef; Take 1 capsule   (500 mg) by mouth 2 times a day for 7 days.   docusate sodium 100 mg capsule; Commonly known as: Colace; Take 1   capsule (100 mg) by mouth 2 times a day for 10 days.   oxyCODONE-acetaminophen 5-325 mg tablet; Commonly known as: Percocet;   Take 1 tablet by mouth every 4 hours if needed for severe pain (7 - 10)   for up to 6 days.   polyethylene glycol 17 gram/dose powder; Commonly known as: Glycolax,   Miralax; Take 17 g by mouth once daily for 10 days.     CHANGE how you take these medications     sulfaSALAzine 500 mg tablet; Commonly known as: Azulfidine; Take 3   tablets (1,500 mg) by mouth 2 times a day. Do not fill before May 27,   2025.; Start taking on: May 27, 2025; What changed: These instructions   start on May 27, 2025. If you are unsure what to do until then, ask your   doctor or other care provider.     CONTINUE taking these medications     Calcium 500 + D 500 mg-5 mcg (200 unit) tablet; Generic drug: calcium   carbonate-vitamin D3   diclofenac sodium 1 % gel; Commonly known as: Voltaren; Apply 4.5 inches   (4 g) topically 4 times a day.   esomeprazole 20 mg DR capsule; Commonly known as: NexIUM   ESSENTIAL Man 0.4-2-250 mg-mg-mcg  tablet; Generic drug:   multivitamin-minerals-folic acid-lycopen-lutein   folic acid 1 mg tablet; Commonly known as: Folvite; TAKE 1 TABLET DAILY   inFLIXimab-dyyb 100 mg injection; Commonly known as: Inflectra; Infuse   900 mg into a venous catheter every 8 (eight) weeks.      Do not start   before Maria Del Carmen 10, 2025.; Start taking on: Mari Adel Carmen 10, 2025   potassium chloride ER 10 mEq ER capsule; Commonly known as: Micro-K     STOP taking these medications     acetaminophen 325 mg capsule; Commonly known as: Tylenol   baclofen 20 mg tablet; Commonly known as: Lioresal   cholecalciferol 25 mcg (1,000 units) capsule; Commonly known as: Vitamin   D-3   ibuprofen 800 mg tablet   valACYclovir 1 gram tablet; Commonly known as: Valtrex       Outpatient Follow-Up  Future Appointments   Date Time Provider Department Center   4/29/2025 10:00 AM BLANKA [id number]   5/19/2025 10:00 [facility]       Chante Gonzales, APRN-CNP

## 2025-04-16 NOTE — PROGRESS NOTES
Patient: Raleigh Owens  Room/bed: 151/151-A  Admitted on: 4/15/2025    Age: 60 y.o.   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 Up in chair. Doing well and offers no new complaints this AM. He denies chest pain, breathing difficulties, abdominal pain, N/V/D/C, fever, or chills.  Anticipates discharge to home today.     Objective    Physical Exam   Constitutional: A&O x 3; NAD; calm and cooperative  Eyes: EOM's intact  HEENT: Normocephalic, Atraumatic. Oral mucosa moist.   Neck: Supple. No JVD, lymphadenopathy.   Lungs: CTAB with fair air movement. Respirations even and unlabored on room air.   Heart: RRR  Abdomen: Soft, non-tender, non-distended, +BS. Obese.   MS/Extremities: MANRIQUE x 4 with LLE weakness. Left hip VAC dressing is C/D/I. No edema. Peripheral pulses intact bilaterally.   Neuro: A&O x3; no focal deficits; gross motor and sensation intact.   Skin: Warm and dry. No rashes or lesions. Facial erythema.   Psych: Normal affect.      Temp:  [35.9 °C (96.6 °F)-37.1 °C (98.8 °F)] 37.1 °C (98.8 °F)  Heart Rate:  [] 82  Resp:  [15-18] 16  BP: (117-168)/(79-99) 121/79    Vitals:    04/15/25 1006   Weight: 97 kg (213 lb 13.5 oz)             I/Os    Intake/Output Summary (Last 24 hours) at 4/16/2025 1124  Last data filed at 4/16/2025 0927  Gross per 24 hour   Intake 2625 ml   Output 525 ml   Net 2100 ml       Labs:   Results from last 72 hours   Lab Units 04/16/25  0557   SODIUM mmol/L 137   POTASSIUM mmol/L 3.7   CHLORIDE mmol/L 103   CO2 mmol/L 27   BUN mg/dL 11   CREATININE mg/dL 0.70   GLUCOSE mg/dL 99   CALCIUM mg/dL 8.1*   ANION GAP mmol/L 11   EGFR mL/min/1.73m*2 >90      Results from last 72 hours   Lab Units 04/16/25  0557   WBC AUTO x10*3/uL 10.3   HEMOGLOBIN g/dL 12.4*   HEMATOCRIT % 37.9*    PLATELETS AUTO x10*3/uL 153      Lab Results   Component Value Date    CALCIUM 8.1 (L) 04/16/2025      Lab Results   Component Value Date    CRP 2.17 (A) 02/15/2023      Micro/ID:   No results found for the last 90 days.      Images:  XR pelvis 1-2 views  Narrative: Interpreted By:  Julieth Seals,   STUDY:  Single view pelvis.      INDICATION:  Signs/Symptoms:SP revision of left RASHAWN.      COMPARISON:  02/04/2025.      ACCESSION NUMBER(S):  [id number]      ORDERING CLINICIAN:  RUBINA JANG      FINDINGS:  No acute fracture or malalignment.  Immediate postsurgical changes of left total hip arthroplasty.  Hardware is intact without perihardware fractures or lucencies.  Expected postsurgical soft tissue gas within the surrounding soft  tissues.      Remote right hip arthroplasty changes with intact hardware.      Impression: 1. Immediate postsurgical changes of left total hip arthroplasty  without hardware complication.      MACRO:  None.      Signed by: Julieth Seals 4/15/2025 5:15 PM  Dictation workstation:   [id number]       [email or web address]    Scheduled medications  Scheduled Medications[1]  Continuous medications  Continuous Medications[2]  PRN medications  PRN Medications[3]     Assessment and Plan    60 y.o. male with a medical history of RA/OA, avascular necrosis of left hip, and RASHAWN in 2006, who presented to [facility] for a conversion to left total hip with removal of hardware. Consulted for medical management.      Elevated Blood Pressure  -Denies history of HTN or taking any antihypertensives  -Suspect may be related to anesthesia, pain, hospitalization  -Add PRN Hydralazine for SBP>160  -Reviewed trends overnight and BP stabilized.   -No need for agent at this time.      Left Hip Pain with Avascular Necrosis, OA  -S/P left conversion of RASHAWN with hardware removal by Dr. Hinkle 4/15/25  -Incisional care, antibiotics, activity restrictions per orthopedics surgery.  -PT/OT to follow. WBAT.   -Medicate for  pain  -Maintain bowel regimen  -Advised IS use, mobilization.   -Monitor H&H for ABLA. EBL 400ml. Hgb stable at 12.4.   -DVT prophylaxis per surgery: Apixaban 2.5mg BID x 4 weeks  -Weaned oxygen overnight w/o difficulty. Please note, he denies history of asthma.  -Afebrile. No leukocytosis.     GERD  -On PPI     Rheumatoid Arthritis  -On Folic acid  -Supportive care     H/O DVT   -Provoked by surgery in 2006  -Considered high risk for DVT and recommend Lovenox/Apixaban for management  -Discussed with orthopedic APRN this AM and changed to Apixaban 2.5mg BID x 4 weeks of therapy.    Fluids/Electrolytes/Nutrition  -Laboratory data reviewed: CBC/BMP.   -Electrolytes stable.   -No nutritional concerns at this time.       Disposition  -Plan of care discussed with medicine, Dr. Minaya, Orthopedics APRN.   -Discharge per attending. Medically cleared.       Tamar Forte, APRN-CNP        [1] acetaminophen, 650 mg, oral, q6h ONUR  aspirin, 81 mg, oral, BID  docusate sodium, 100 mg, oral, BID  folic acid, 1 mg, oral, Daily  pantoprazole, 20 mg, oral, Daily before breakfast  polyethylene glycol, 17 g, oral, Daily  potassium chloride CR, 10 mEq, oral, Daily  [2] lactated Ringer's, 100 mL/hr, Last Rate: 100 mL/hr (04/16/25 1415)  oxygen, 2 L/min  [3] PRN medications: hydrALAZINE, morphine, naloxone, ondansetron **OR** ondansetron, oxyCODONE, oxyCODONE

## 2025-04-16 NOTE — PROGRESS NOTES
"Physical Therapy    Physical Therapy Evaluation    Patient Name: Raleigh Owens \"Liliana"  MRN: [id number]  Department:   Room: [address] [place] [address]  Today's Date: 4/16/2025   Time Calculation  Start Time: 0934  Stop Time: 1006  Time Calculation (min): 32 min    Assessment/Plan   PT Assessment  PT Assessment Results: Decreased mobility, Orthopedic restrictions  Rehab Prognosis: Excellent  Barriers to Discharge Home: No anticipated barriers  Evaluation/Treatment Tolerance: Patient tolerated treatment well  Medical Staff Made Aware: Yes  Strengths: Ability to acquire knowledge  End of Session Communication: Bedside nurse  End of Session Patient Position: Up in chair, Alarm off, not on at start of session (Pt's RN is OK with no alarm as Pt is using his call light appropriately)     PT Plan  PT Eval Only Reason: Safe to return home  PT Discharge Recommendations: Low intensity level of continued care  Equipment Recommended upon Discharge: Wheeled walker  PT Recommended Transfer Status: Independent  PT - OK to Discharge: Yes (LOW follow services)    Subjective   General Visit Information:  General  Reason for Referral: 59 yo male admitted 2' to L hip pain s/p conversion L RASHAWN with removal of hardware, posterior approach revision L RASHAWN  Referred By: Dr. HAZEL Hinkle  Past Medical History Relevant to Rehab: RA/OA, avascular necrosis L hip with RASHAWN (2006), rotator cuff tear  Family/Caregiver Present: No  Prior to Session Communication: Bedside nurse  Patient Position Received: Up in chair, Alarm off, not on at start of session  Preferred Learning Style: verbal, visual, written  General Comment: Pt is pleasant and agreeable to work with PT. PT went over Pt's RASHAWN precautions and Pt performed his RASHAWN protocol ex's. Pt doesn't need further PT services  here as he s D/C home today with his significant other. Recommend LOW follow up services.  Home Living:  Home Living  Type of Home: House  Lives With: Significant other  Home Adaptive Equipment: " Walker rolling or standard, Cane  Home Layout: One level  Home Access: Stairs to enter with rails  Entrance Stairs-Rails: Both  Entrance Stairs-Number of Steps: 2  Bathroom Shower/Tub: Walk-in shower  Bathroom Toilet: Standard  Bathroom Equipment: Grab bars in shower, Shower chair with back  Prior Level of Function:  Prior Function Per Pt/Caregiver Report  Level of [place]: Independent with ADLs and functional transfers, Independent with homemaking with ambulation (no device)  Receives Help From: Family  Ambulatory Assistance: Independent  Precautions:  Precautions  LE Weight Bearing Status: Weight Bearing as Tolerated (L LE)  Post-Surgical Precautions: Left hip precautions      Date/Time Vitals Session Patient Position Pulse Resp SpO2 BP MAP (mmHg)    04/16/25 0948 --  --  --  --  91 %  --  --                 Objective   Pain:  Pain Assessment  Pain Assessment: 0-10  0-10 (Numeric) Pain Score:  (0/10 at rest, 4/10 with movement and ambulation)  Pain Type: Surgical pain  Pain Location: Hip  Pain Orientation: Right  Cognition:  Cognition  Overall Cognitive Status: Within Functional Limits    General Assessments:  General Observation  General Observation: Pt performed the following long sitting ex's; B AP, L qs, glut sets, L heel slides, L hip abd and L SAQ each x 20 reps               Activity Tolerance  Endurance: Endurance does not limit participation in activity    Sensation  Light Touch: No apparent deficits    Strength  Strength Comments: B UE and R LE are WFL. L hip not tested otherwise L LE is WFL  Static Sitting Balance  Static Sitting-Balance Support: Bilateral upper extremity supported, Feet supported  Static Sitting-Level of Assistance: Independent    Static Standing Balance  Static Standing-Balance Support: Bilateral upper extremity supported (FWW)  Static Standing-Level of Assistance: Close supervision  Dynamic Standing Balance  Dynamic Standing-Balance Support: Bilateral upper extremity supported  (FWW)  Dynamic Standing-Level of Assistance: Close supervision  Functional Assessments:  Bed Mobility  Bed Mobility: No    Transfers  Transfer: Yes  Transfer 1  Transfer From 1: Chair with arms to  Transfer to 1: Stand  Technique 1: Sit to stand, Stand to sit  Transfer Device 1:  (FWW)  Transfer Level of Assistance 1: Close supervision  Trials/Comments 1: Pt also performed a car transfer with close supervision    Ambulation/Gait Training  Ambulation/Gait Training Performed: Yes  Ambulation/Gait Training 1  Surface 1: Level tile  Device 1: Rolling walker  Assistance 1: Close supervision  Quality of Gait 1: Decreased step length (Decreased soni)  Comments/Distance (ft) 1: 140 feet x 2    Stairs  Stairs: Yes  Stairs  Rails 1: Bilateral  Curb Step 1: No  Device 1: No device  Assistance 1: Close supervision  Comment/Number of Steps 1: 4  Extremity/Trunk Assessments:  RUE   RUE : Within Functional Limits  LUE   LUE: Within Functional Limits  RLE   RLE : Within Functional Limits  LLE   LLE :  (L hip not tested, othrewise L LE is WFL)  Outcome Measures:  [facility] Basic Mobility  Turning from your back to your side while in a flat bed without using bedrails: None  Moving from lying on your back to sitting on the side of a flat bed without using bedrails: None  Moving to and from bed to chair (including a wheelchair): A little  Standing up from a chair using your arms (e.g. wheelchair or bedside chair): A little  To walk in hospital room: A little  Climbing 3-5 steps with railing: A little  Basic Mobility - Total Score: 20          Education Documentation  Handouts, taught by Winston Muhammad PT at 4/16/2025 11:26 AM.  Learner: Patient  Readiness: Eager  Method: Demonstration, Handout  Response: Demonstrated Understanding    Precautions, taught by Winston Muhammad PT at 4/16/2025 11:26 AM.  Learner: Patient  Readiness: Eager  Method: Demonstration, Handout  Response: Demonstrated Understanding    Home Exercise Program, taught by  Winston Muhammad, PT at 4/16/2025 11:26 AM.  Learner: Patient  Readiness: Eager  Method: Demonstration, Handout  Response: Demonstrated Understanding    Mobility Training, taught by Winston Muhammad, PT at 4/16/2025 11:26 AM.  Learner: Patient  Readiness: Eager  Method: Demonstration, Handout  Response: Demonstrated Understanding    Education Comments  No comments found.

## 2025-04-16 NOTE — CARE PLAN
The patient's goals for the shift include  rest    Problem: Pain - Adult  Goal: Verbalizes/displays adequate comfort level or baseline comfort level  Outcome: Progressing     Problem: Safety - Adult  Goal: Free from fall injury  Outcome: Progressing     Problem: Discharge Planning  Goal: Discharge to home or other facility with appropriate resources  Outcome: Progressing     Problem: Chronic Conditions and Co-morbidities  Goal: Patient's chronic conditions and co-morbidity symptoms are monitored and maintained or improved  Outcome: Progressing     The clinical goals for the shift include pain control

## 2025-04-16 NOTE — HH CARE COORDINATION
Home Care received a Referral for Physical Therapy. We have processed the referral for a Start of Care on 4/17/2025.     If you have any questions or concerns, please feel free to contact us at [phone]. Follow the prompts, enter your five digit zip code, and you will be directed to your care team on EAST 2.

## 2025-04-16 NOTE — PROGRESS NOTES
04/16/25 1047   Discharge Planning   Living Arrangements Spouse/significant other   Support Systems Spouse/significant other;Family members;Friends/neighbors   Assistance Needed Patient is A&O X3, on room air, independent with ADLs and uses no DME at baseline. Patient has the following DME to use post-op if needed: walker, crutches, bedside commode and shower chair. Patient was driving prior to surgery. Patient voices no other DC needs other than new [facility] that referral was previously sent for prior to surgery. Patient is discharging with prevena wound vac that will remain in place until follow up appointment. Patient and significant other are aware of need to keep wound vac charged at all times.   Type of Residence Private residence   Number of Stairs to Enter Residence 3   Number of Stairs Within Residence 0   Do you have animals or pets at home? Yes   Type of Animals or Pets CATS   Who is requesting discharge planning? Provider   Home or Post Acute Services In home services   Type of Home Care Services Home PT   Expected Discharge Disposition Home H   Does the patient need discharge transport arranged? No   Financial Resource Strain   How hard is it for you to pay for the very basics like food, housing, medical care, and heating? Not hard   Housing Stability   In the last 12 months, was there a time when you were not able to pay the mortgage or rent on time? N   At any time in the past 12 months, were you homeless or living in a shelter (including now)? N   Transportation Needs   In the past 12 months, has lack of transportation kept you from medical appointments or from getting medications? no   In the past 12 months, has lack of transportation kept you from meetings, work, or from getting things needed for daily living? No   Patient Choice   Provider Choice list and CMS website (https://medicare.gov/care-compare#search) for post-acute Quality and Resource Measure Data were provided and reviewed with:  Patient   Patient / Family choosing to utilize agency / facility established prior to hospitalization No   Stroke Family Assessment   Stroke Family Assessment Needed No   Intensity of Service   Intensity of Service 0-30 min

## 2025-04-17 ENCOUNTER — PATIENT OUTREACH (OUTPATIENT)
Dept: CARE COORDINATION | Facility: CLINIC | Age: 60
End: 2025-04-17
Payer: COMMERCIAL

## 2025-04-17 SDOH — ECONOMIC STABILITY: GENERAL: WOULD YOU LIKE HELP WITH ANY OF THE FOLLOWING NEEDS?: I DONT NEED HELP WITH ANY OF THESE

## 2025-04-17 NOTE — PROGRESS NOTES
Outreach call to patient to support a smooth transition of care from recent admission.  Spoke with patient, reviewed discharge medications, discharge instructions, assessed social needs, and provided education on importance of follow-up appointment with provider.  Will continue to monitor through transition period.  Admitted with  total replacement of left hip joint     Medications  Medications reviewed with patient/caregiver?: Yes (4/17/2025  2:25 PM)  Is the patient having any side effects they believe may be caused by any medication additions or changes?: No (4/17/2025  2:25 PM)  Care Management Interventions: Provided patient education (4/17/2025  2:25 PM)  Is the patient taking all medications as directed (includes completed medication regime)?: Yes (4/17/2025  2:25 PM)  Care Management Interventions: Provided patient education (4/17/2025  2:25 PM)    Appointments  Does the patient have a primary care provider?: Yes (4/17/2025  2:25 PM)  Care Management Interventions: Educated patient on importance of making appointment (4/17/2025  2:25 PM)    Self Management  What is the home health agency?: [facility] (4/17/2025  2:25 PM)  Home Health Interventions: Called home health agency (Tyesha will see patient on 4/18 called patient back to advise.) (4/17/2025  2:25 PM)  Follow Up Tasks: Home Health (4/17/2025  2:25 PM)    Patient Teaching  Does the patient have access to their discharge instructions?: Yes (4/17/2025  2:25 PM)  Care Management Interventions: Reviewed instructions with patient (4/17/2025  2:25 PM)  What is the patient's perception of their health status since discharge?: Improving (4/17/2025  2:25 PM)  Patient/Caregiver Education Comments: Patient discharged with prevena wound vac that will remain in place until follow up appointment. Patient and significant other are aware of need to keep wound vac charged at all times. (4/17/2025  2:25 PM)    Patient has the following DME to use post-op walker, crutches,  bedside commode and shower chair       Monica Arteaga , RN   Nurse Care Manager   [facility]   [phone]

## 2025-04-18 ENCOUNTER — HOME CARE VISIT (OUTPATIENT)
Dept: HOME HEALTH SERVICES | Facility: HOME HEALTH | Age: 60
End: 2025-04-18
Payer: COMMERCIAL

## 2025-04-18 PROCEDURE — G0151 HHCP-SERV OF PT,EA 15 MIN: HCPCS

## 2025-04-19 VITALS
TEMPERATURE: 98.4 F | DIASTOLIC BLOOD PRESSURE: 70 MMHG | OXYGEN SATURATION: 95 % | HEART RATE: 105 BPM | SYSTOLIC BLOOD PRESSURE: 120 MMHG

## 2025-04-19 SDOH — HEALTH STABILITY: PHYSICAL HEALTH: PHYSICAL EXERCISE: 15

## 2025-04-19 SDOH — HEALTH STABILITY: PHYSICAL HEALTH: EXERCISE ACTIVITY: LAQ

## 2025-04-19 SDOH — HEALTH STABILITY: PHYSICAL HEALTH: EXERCISE ACTIVITY: APS, QS, GS, HEEL SLIDES, HIP ABD, SAQ

## 2025-04-19 SDOH — HEALTH STABILITY: PHYSICAL HEALTH: PHYSICAL EXERCISE: SUPINE

## 2025-04-19 SDOH — HEALTH STABILITY: PHYSICAL HEALTH: PHYSICAL EXERCISE: SEATED

## 2025-04-19 ASSESSMENT — ENCOUNTER SYMPTOMS
PAIN LOCATION - PAIN SEVERITY: 2/10
PAIN: 1
PERSON REPORTING PAIN: PATIENT
HIGHEST PAIN SEVERITY IN PAST 24 HOURS: 3/10
OCCASIONAL FEELINGS OF UNSTEADINESS: 0
PAIN LOCATION: LEFT HIP
LOWEST PAIN SEVERITY IN PAST 24 HOURS: 1/10

## 2025-04-19 ASSESSMENT — ACTIVITIES OF DAILY LIVING (ADL)
AMBULATION_DISTANCE/DURATION_TOLERATED: 90 FEET
OASIS_M1830: 05
AMBULATION ASSISTANCE ON FLAT SURFACES: 1
ENTERING_EXITING_HOME: STAND BY ASSIST

## 2025-04-19 NOTE — CASE COMMUNICATION
Patient  Primary Reason for Home Care: Status post left hip revision  Skilled Needs: requires instruction in hip precautions, therexs for rom and strength left hip, address pain management and improve function in and out of the home   Instruction provided: therexs for aidan, in wound care, hip precautions and reviewed meds   Patient response to instruction: patient is motivated and willing to learn, caregiver present as well   Patient ins tructed on plan of care and visit frequency.   Patient in agreement with plan of care and visit frequency.    Discipline Communication: PTA  Plan for next visit: therexs, gait, transfers and pain management    Medication Reconciliation:    Demonstrates Adherence : Yes  Issues/Concerns: No  Provider Notified of Issues/Concerns: N/A  Caregiver Notified of Issues/Concerns: Yes  patient  response to instructions Verbalized Understanding  Pi ll bottles visualized during treatment Yes

## 2025-04-21 ENCOUNTER — HOSPITAL ENCOUNTER (OUTPATIENT)
Dept: VASCULAR MEDICINE | Facility: HOSPITAL | Age: 60
Discharge: HOME | End: 2025-04-21
Payer: COMMERCIAL

## 2025-04-21 ENCOUNTER — OFFICE VISIT (OUTPATIENT)
Dept: ORTHOPEDIC SURGERY | Facility: CLINIC | Age: 60
End: 2025-04-21
Payer: COMMERCIAL

## 2025-04-21 DIAGNOSIS — M79.605 LEFT LEG PAIN: Primary | ICD-10-CM

## 2025-04-21 DIAGNOSIS — M79.605 LEFT LEG PAIN: ICD-10-CM

## 2025-04-21 PROCEDURE — 93971 EXTREMITY STUDY: CPT

## 2025-04-21 PROCEDURE — 93971 EXTREMITY STUDY: CPT | Performed by: STUDENT IN AN ORGANIZED HEALTH CARE EDUCATION/TRAINING PROGRAM

## 2025-04-21 ASSESSMENT — PAIN SCALES - GENERAL: PAINLEVEL_OUTOF10: 3

## 2025-04-21 ASSESSMENT — PAIN - FUNCTIONAL ASSESSMENT: PAIN_FUNCTIONAL_ASSESSMENT: 0-10

## 2025-04-21 NOTE — PROGRESS NOTES
History of Present Illness  Chief Complaint   Patient presents with    Left Hip - Post-op     CONVERSION PREVIOUS HIP, TOTAL HIP ARTHROPLASTY AND REMOVAL HARDWARE - LEFT-4/15/25-Pt noticed redness and swelling around incision site starting Friday night. He states there is nothing emptying into the wound vac-Pt has also noticed lump behind left knee with some calf pain    Swelling and pain increased after PT and being up an moving on the leg on Friday.  Arrived with wound vac still in place.  Not wearing MELONY hose in office, states he has been icing regularly.  Currently on Eliquis for history of blood clots.     Review of Systems   GENERAL: Negative for malaise, significant weight loss, fever  MUSCULOSKELETAL: see HPI  NEURO:  Negative     Examination  side: left leg  incision with wound vac without erythema, warmth, or drainage from the wound  +2 pitting edema from hip to calf with large amount of fluid collection  Pain about lateral side of thigh and knee.     Assessment:  Patient status post side: left lip arthroplasty conversion     Plan  Orders entered for STAT ultrasound to assess for blood clots in leg.  Will look to admit if US is positive for clots.  Follow-up:  Will await ultrasound results    Vanessa Merino PA-C  04/21/25

## 2025-04-22 ENCOUNTER — HOME CARE VISIT (OUTPATIENT)
Dept: HOME HEALTH SERVICES | Facility: HOME HEALTH | Age: 60
End: 2025-04-22
Payer: COMMERCIAL

## 2025-04-22 ENCOUNTER — TELEPHONE (OUTPATIENT)
Dept: ORTHOPEDIC SURGERY | Facility: CLINIC | Age: 60
End: 2025-04-22
Payer: COMMERCIAL

## 2025-04-22 VITALS
HEART RATE: 78 BPM | SYSTOLIC BLOOD PRESSURE: 142 MMHG | TEMPERATURE: 98 F | OXYGEN SATURATION: 96 % | RESPIRATION RATE: 12 BRPM | DIASTOLIC BLOOD PRESSURE: 82 MMHG

## 2025-04-22 PROCEDURE — G0157 HHC PT ASSISTANT EA 15: HCPCS | Mod: CQ

## 2025-04-22 ASSESSMENT — ENCOUNTER SYMPTOMS
PAIN LOCATION - EXACERBATING FACTORS: ACTIVITY, TIME OF DAY
PERSON REPORTING PAIN: PATIENT
PAIN LOCATION: LEFT HIP
PAIN LOCATION - PAIN DURATION: SINCE SURGERY
SUBJECTIVE PAIN PROGRESSION: WAXING AND WANING
PAIN LOCATION - RELIEVING FACTORS: MEDICATION, REST, ICE
PAIN LOCATION - PAIN QUALITY: ACHING, TIGHT
PAIN SEVERITY GOAL: 0/10
PAIN LOCATION - PAIN FREQUENCY: CONSTANT
PAIN: 1
PAIN LOCATION - PAIN SEVERITY: 1/10
HIGHEST PAIN SEVERITY IN PAST 24 HOURS: 5/10
LOWEST PAIN SEVERITY IN PAST 24 HOURS: 1/10

## 2025-04-22 NOTE — TELEPHONE ENCOUNTER
Called and reviewed results with patient.  He states swelling was improved today over yesterday, especially prior to PT.  Continue with Eliquis, compression hose, RICE.  Should return to office if symptoms worsen.

## 2025-04-24 ENCOUNTER — HOME CARE VISIT (OUTPATIENT)
Dept: HOME HEALTH SERVICES | Facility: HOME HEALTH | Age: 60
End: 2025-04-24
Payer: COMMERCIAL

## 2025-04-24 VITALS
SYSTOLIC BLOOD PRESSURE: 120 MMHG | TEMPERATURE: 98.4 F | DIASTOLIC BLOOD PRESSURE: 70 MMHG | OXYGEN SATURATION: 93 % | RESPIRATION RATE: 12 BRPM | HEART RATE: 100 BPM

## 2025-04-24 DIAGNOSIS — J45.909 ASTHMA, UNSPECIFIED ASTHMA SEVERITY, UNSPECIFIED WHETHER COMPLICATED, UNSPECIFIED WHETHER PERSISTENT (HHS-HCC): Primary | ICD-10-CM

## 2025-04-24 PROCEDURE — G0157 HHC PT ASSISTANT EA 15: HCPCS | Mod: CQ

## 2025-04-24 ASSESSMENT — ENCOUNTER SYMPTOMS
PAIN LOCATION - PAIN QUALITY: ACHING,
PAIN LOCATION - RELIEVING FACTORS: MEDICATION, ICE, REST
SUBJECTIVE PAIN PROGRESSION: RAPIDLY IMPROVING
PAIN LOCATION - EXACERBATING FACTORS: ACTIVITY, TIME OF DAY
LOWEST PAIN SEVERITY IN PAST 24 HOURS: 0/10
PERSON REPORTING PAIN: PATIENT
PAIN LOCATION - PAIN FREQUENCY: CONSTANT
PAIN: 1
HIGHEST PAIN SEVERITY IN PAST 24 HOURS: 1/10
PAIN LOCATION: LEFT HIP
PAIN LOCATION - PAIN DURATION: SINCE SURGERY
PAIN LOCATION - PAIN SEVERITY: 1/10
PAIN SEVERITY GOAL: 0/10

## 2025-04-25 LAB
LABORATORY COMMENT REPORT: NORMAL
PATH REPORT.FINAL DX SPEC: NORMAL
PATH REPORT.GROSS SPEC: NORMAL
PATH REPORT.RELEVANT HX SPEC: NORMAL
PATH REPORT.TOTAL CANCER: NORMAL

## 2025-04-28 ENCOUNTER — HOME CARE VISIT (OUTPATIENT)
Dept: HOME HEALTH SERVICES | Facility: HOME HEALTH | Age: 60
End: 2025-04-28
Payer: COMMERCIAL

## 2025-04-28 VITALS
HEART RATE: 92 BPM | DIASTOLIC BLOOD PRESSURE: 72 MMHG | RESPIRATION RATE: 12 BRPM | SYSTOLIC BLOOD PRESSURE: 124 MMHG | TEMPERATURE: 97.9 F

## 2025-04-28 PROCEDURE — G0157 HHC PT ASSISTANT EA 15: HCPCS | Mod: CQ

## 2025-04-28 ASSESSMENT — ENCOUNTER SYMPTOMS
PERSON REPORTING PAIN: PATIENT
PAIN LOCATION - PAIN SEVERITY: 1/10
PAIN: 1
SUBJECTIVE PAIN PROGRESSION: GRADUALLY IMPROVING
LOWEST PAIN SEVERITY IN PAST 24 HOURS: 0/10
HIGHEST PAIN SEVERITY IN PAST 24 HOURS: 2/10
PAIN LOCATION - PAIN DURATION: SINCE SURGERY
PAIN SEVERITY GOAL: 0/10
PAIN LOCATION - PAIN FREQUENCY: FREQUENT
PAIN LOCATION - EXACERBATING FACTORS: ACTIVITY, TIME OF DAY
PAIN LOCATION - PAIN QUALITY: ACHING
PAIN LOCATION: LEFT HIP

## 2025-04-29 ENCOUNTER — APPOINTMENT (OUTPATIENT)
Dept: ORTHOPEDIC SURGERY | Facility: CLINIC | Age: 60
End: 2025-04-29
Payer: COMMERCIAL

## 2025-04-29 ENCOUNTER — HOSPITAL ENCOUNTER (OUTPATIENT)
Dept: RADIOLOGY | Facility: HOSPITAL | Age: 60
Discharge: HOME | End: 2025-04-29
Payer: COMMERCIAL

## 2025-04-29 ENCOUNTER — OFFICE VISIT (OUTPATIENT)
Dept: ORTHOPEDIC SURGERY | Facility: CLINIC | Age: 60
End: 2025-04-29
Payer: COMMERCIAL

## 2025-04-29 DIAGNOSIS — M25.552 LEFT HIP PAIN: ICD-10-CM

## 2025-04-29 DIAGNOSIS — M79.605 LEFT LEG PAIN: ICD-10-CM

## 2025-04-29 DIAGNOSIS — G89.29 CHRONIC PAIN OF LEFT KNEE: ICD-10-CM

## 2025-04-29 DIAGNOSIS — M25.562 CHRONIC PAIN OF LEFT KNEE: ICD-10-CM

## 2025-04-29 DIAGNOSIS — Z96.642 H/O TOTAL HIP ARTHROPLASTY, LEFT: Primary | ICD-10-CM

## 2025-04-29 PROCEDURE — 73502 X-RAY EXAM HIP UNI 2-3 VIEWS: CPT | Mod: LEFT SIDE | Performed by: RADIOLOGY

## 2025-04-29 PROCEDURE — 73502 X-RAY EXAM HIP UNI 2-3 VIEWS: CPT | Mod: LT

## 2025-04-29 PROCEDURE — 99024 POSTOP FOLLOW-UP VISIT: CPT

## 2025-04-29 PROCEDURE — 20610 DRAIN/INJ JOINT/BURSA W/O US: CPT

## 2025-04-29 RX ORDER — TRIAMCINOLONE ACETONIDE 40 MG/ML
40 INJECTION, SUSPENSION INTRA-ARTICULAR; INTRAMUSCULAR
Status: COMPLETED | OUTPATIENT
Start: 2025-04-29 | End: 2025-04-29

## 2025-04-29 RX ORDER — LIDOCAINE HYDROCHLORIDE 5 MG/ML
4 INJECTION, SOLUTION INFILTRATION; PERINEURAL
Status: COMPLETED | OUTPATIENT
Start: 2025-04-29 | End: 2025-04-29

## 2025-04-29 RX ADMIN — LIDOCAINE HYDROCHLORIDE 4 ML: 5 INJECTION, SOLUTION INFILTRATION; PERINEURAL at 12:10

## 2025-04-29 RX ADMIN — TRIAMCINOLONE ACETONIDE 40 MG: 40 INJECTION, SUSPENSION INTRA-ARTICULAR; INTRAMUSCULAR at 12:10

## 2025-04-29 ASSESSMENT — PAIN - FUNCTIONAL ASSESSMENT: PAIN_FUNCTIONAL_ASSESSMENT: 0-10

## 2025-04-29 ASSESSMENT — PAIN SCALES - GENERAL: PAINLEVEL_OUTOF10: 1

## 2025-04-29 NOTE — PROGRESS NOTES
History of Present Illness  Chief Complaint   Patient presents with    Left Hip - Post-op     4/15/25 LT RASHAWN REV / PARTIAL   Patient returns today denying any significant pain.  Endorses some relief of pre-op symptoms.  He has been working with home therapy still, has one more home session.  Using crutches in office, but states he is using a cane at home at this point. The swelling has been greatly improved. Also having some pain in the left knee. Denies any new neuro deficits. No fever or drainage through the wound vac.     Review of Systems   GENERAL: Negative for malaise, significant weight loss, fever  MUSCULOSKELETAL: see HPI  NEURO:  Negative     Examination  side: left hip  Healthy incision without erythema, warmth, or drainage. Mild edema.  Sutures and staples left in place.   Sensation intact distal to incision.  Strength 4-/5 about the hip  Good cap refill    X-rays were taken today of the hip and were interpreted by myself and show no issues or complications with left hip arthroplasty, no evidence of loosening. No new issues.     X-ray of the left knee from 2/4/25 was also reviewed and was read by radiology and show Advanced osteoarthritis of the left knee worst medially with multiple loose bodies.     Assessment:  Patient status post side: left total hip revision, left knee pain with osteoarthritis     Plan  Patient to continue to use left hip to tolerance.  Weight bearing as tolerated.  Wound is healing nicely. Discussed incision care.  Staples and sutures left in place for another week.  Bandage placed over incision to protect.  Orders given for outpatient PT if needed once home therapy completed.    He is also having issues with increased left knee pain.  Would like to try a steroid injection into the left knee to help with pain and edema.  The left knee was injected per procedure note below.    Follow-up: in 1 week for suture removal and incision check, do not need new imaging    Vanessa Merino  "BLANKA  04/29/25    Patient ID: Raleigh Owens \"Liliana" is a 60 y.o. male.    L Inj/Asp: L knee on 4/29/2025 12:10 PM  Indications: pain  Details: 22 G needle, anterolateral approach  Medications: 40 mg triamcinolone acetonide 40 mg/mL; 4 mL lidocaine 5 mg/mL (0.5 %)  Outcome: tolerated well, no immediate complications  Procedure, treatment alternatives, risks and benefits explained, specific risks discussed. Consent was given by the patient. Immediately prior to procedure a time out was called to verify the correct patient, procedure, equipment, support staff and site/side marked as required. Patient was prepped and draped in the usual sterile fashion.               "

## 2025-04-30 ENCOUNTER — HOME CARE VISIT (OUTPATIENT)
Dept: HOME HEALTH SERVICES | Facility: HOME HEALTH | Age: 60
End: 2025-04-30
Payer: COMMERCIAL

## 2025-04-30 VITALS
SYSTOLIC BLOOD PRESSURE: 124 MMHG | TEMPERATURE: 98.7 F | RESPIRATION RATE: 12 BRPM | DIASTOLIC BLOOD PRESSURE: 72 MMHG | HEART RATE: 70 BPM

## 2025-04-30 PROCEDURE — G0157 HHC PT ASSISTANT EA 15: HCPCS | Mod: CQ

## 2025-04-30 ASSESSMENT — ENCOUNTER SYMPTOMS
PAIN LOCATION - RELIEVING FACTORS: ICE, MEDICATION
PAIN LOCATION - PAIN FREQUENCY: FREQUENT
PAIN LOCATION - PAIN QUALITY: ACHING
HIGHEST PAIN SEVERITY IN PAST 24 HOURS: 2/10
PAIN LOCATION - EXACERBATING FACTORS: ACTIVITY, TIME OF DAY
PAIN: 1
PAIN LOCATION - PAIN SEVERITY: 1/10
PAIN LOCATION: LEFT HIP
PERSON REPORTING PAIN: PATIENT
PAIN LOCATION - PAIN DURATION: SINCE SURGERY
LOWEST PAIN SEVERITY IN PAST 24 HOURS: 0/10
PAIN SEVERITY GOAL: 0/10

## 2025-04-30 NOTE — PROGRESS NOTES
"  Pharmacy Post-Discharge Visit    Raleigh Owens \"Liliana" is a 60 y.o. male was referred to Clinical Pharmacy Team to complete a post-discharge medication optimization and monitoring visit.  The patient was referred for their Medication Visit.    Pt is here for First appointment.   Referring Provider: Madeleine Llanos DO  PCP: Madeleine Llanos DO, last visit: 8/15/23, next visit: not scheduled, last visit cancelled    Admission Date: 4/15/25  Discharge Date: 4/16/25  Discharge Diagnosis: Left hip pain    Subjective   HPI  PMH significant for Asthma (platinum referral).    Medication System Management  Patients preferred pharmacy:   [facility] [address] [place] [address] [address] [address]  Phone: [phone] Fax: [phone]     [facility]  [address] [facility] [address]  Phone: [phone] Fax: [phone]     [facility]  [address] [facility] [address]  Phone: [phone] Fax: [phone]  Adherence/Organization: No current concerns  Affordability/Accessibility: No current concerns  Adverse Effects: No current concerns    Drug Interactions  No relevant drug interactions were noted.    Preventative Care  Immunizations Needed: deferred  Tobacco Use: deferred      Objective   Allergies[1]  Social History     Social History Narrative    Not on file      Medication Review  Current Outpatient Medications   Medication Instructions    calcium carbonate-vitamin D3 (Calcium 500 + D) 500 mg-5 mcg (200 unit) tablet 1 tablet, Daily    Eliquis 2.5 mg, oral, 2 times daily    esomeprazole (NexIUM) 20 mg DR capsule 1 capsule, Daily RT    folic acid (Folvite) 1 mg tablet TAKE 1 TABLET DAILY    [START ON 6/10/2025] inFLIXimab-dyyb (INFLECTRA) 900 mg, intravenous, Every 8 weeks    multivit-min-FA-lycopen-lutein (ESSENTIAL Man) 0.4-2-250 mg-mg-mcg tablet 1 tablet, Daily    potassium chloride ER (Micro-K) 10 mEq ER capsule 10 mEq, " "Daily    [START ON 5/27/2025] sulfaSALAzine (AZULFIDINE) 1,500 mg, oral, 2 times daily      Vitals  BP Readings from Last 2 Encounters:   04/30/25 124/72   04/28/25 124/72     Labs  A1C  Lab Results   Component Value Date    HGBA1C 4.8 04/01/2025     BMP  Lab Results   Component Value Date    CALCIUM 8.1 (L) 04/16/2025     04/16/2025    K 3.7 04/16/2025    CO2 27 04/16/2025     04/16/2025    BUN 11 04/16/2025    CREATININE 0.70 04/16/2025    EGFR >90 04/16/2025     LFTs  Lab Results   Component Value Date    ALT 23 04/01/2025    AST 23 04/01/2025    ALKPHOS 198 (H) 04/01/2025    BILITOT 0.5 04/01/2025     FLP  Lab Results   Component Value Date    TRIG 152 (H) 05/10/2022    CHOL 169 05/10/2022    LDLF 88 05/10/2022    HDL 50.7 05/10/2022     Urine Microalbumin  No results found for: \"MICROALBCREA\"  Wt Readings from Last 3 Encounters:   04/15/25 97 kg (213 lb 13.5 oz)   04/01/25 98.7 kg (217 lb 9.5 oz)   03/24/25 99.7 kg (219 lb 14.5 oz)      BMI Readings from Last 1 Encounters:   04/15/25 30.68 kg/m²       Assessment/Plan   Problem List Items Addressed This Visit          Pulmonary and Pneumonias    Asthma     Medication review completed and outlined below. All patient questions and concerns addressed at this time.   Meds added: none  Meds removed: Duricef, Voltaren gel, docusate, MIralax  Meds changes: none    Patient Education:  Counseled patient on relevant medication mechanisms of action, expectations, side effects, duration of therapy, contraindications, administration, and monitoring parameters.  All questions and concerns addressed. Contact pharmacist with any further questions or concerns prior to next appointment.    Clinical Pharmacist follow-up: as needed, Telehealth visit  Patient has not seen PCP in over a year, not making changes to medication. Did not forward to PCP listed.  Patient is not followed in [facility].    Thank you,  Neal Medina PharmD, BCACP  Clinical Pharmacy Specialist-Primary " Care  [phone]      Continue all meds under the continuation of care with the referring provider and clinical pharmacy team.  Verbal consent to manage patient's drug therapy was obtained from the patient. They were informed they may decline to participate or withdraw from participation in pharmacy services at any time.         [1] No Known Allergies

## 2025-05-01 ENCOUNTER — TELEMEDICINE (OUTPATIENT)
Dept: PHARMACY | Facility: HOSPITAL | Age: 60
End: 2025-05-01
Payer: COMMERCIAL

## 2025-05-01 DIAGNOSIS — J45.909 ASTHMA, UNSPECIFIED ASTHMA SEVERITY, UNSPECIFIED WHETHER COMPLICATED, UNSPECIFIED WHETHER PERSISTENT (HHS-HCC): ICD-10-CM

## 2025-05-05 ENCOUNTER — HOME CARE VISIT (OUTPATIENT)
Dept: HOME HEALTH SERVICES | Facility: HOME HEALTH | Age: 60
End: 2025-05-05
Payer: COMMERCIAL

## 2025-05-05 VITALS
DIASTOLIC BLOOD PRESSURE: 80 MMHG | RESPIRATION RATE: 12 BRPM | SYSTOLIC BLOOD PRESSURE: 120 MMHG | HEART RATE: 90 BPM | TEMPERATURE: 98.4 F

## 2025-05-05 PROCEDURE — G0157 HHC PT ASSISTANT EA 15: HCPCS | Mod: CQ

## 2025-05-05 ASSESSMENT — ENCOUNTER SYMPTOMS
PAIN LOCATION: LEFT KNEE
PAIN LOCATION - PAIN SEVERITY: 3/10
PAIN LOCATION - PAIN QUALITY: ACHING
PAIN LOCATION - PAIN DURATION: CHRONIC
PAIN LOCATION - PAIN SEVERITY: 1/10
PAIN: 1
PAIN LOCATION: LEFT HIP
PERSON REPORTING PAIN: PATIENT
PAIN LOCATION - PAIN FREQUENCY: WITH ACTIVITY
PAIN LOCATION - EXACERBATING FACTORS: ACTIVITY, TIME OF DAY
PAIN LOCATION - PAIN FREQUENCY: CONSTANT
PAIN LOCATION - PAIN DURATION: SINCE SURGERY
PAIN LOCATION - EXACERBATING FACTORS: ACTIVITY

## 2025-05-06 ENCOUNTER — SPECIALTY PHARMACY (OUTPATIENT)
Dept: PHARMACY | Facility: CLINIC | Age: 60
End: 2025-05-06

## 2025-05-06 ENCOUNTER — APPOINTMENT (OUTPATIENT)
Dept: ORTHOPEDIC SURGERY | Facility: CLINIC | Age: 60
End: 2025-05-06
Payer: COMMERCIAL

## 2025-05-06 DIAGNOSIS — M17.12 ARTHRITIS OF LEFT KNEE: ICD-10-CM

## 2025-05-06 DIAGNOSIS — Z96.642 H/O TOTAL HIP ARTHROPLASTY, LEFT: Primary | ICD-10-CM

## 2025-05-06 PROCEDURE — 99024 POSTOP FOLLOW-UP VISIT: CPT

## 2025-05-06 RX ORDER — HYALURONATE SODIUM, STABILIZED 60 MG/3 ML
60 SYRINGE (ML) INTRAARTICULAR ONCE
Qty: 3 ML | Refills: 0 | Status: SHIPPED | OUTPATIENT
Start: 2025-05-06 | End: 2025-05-07

## 2025-05-06 ASSESSMENT — PAIN SCALES - GENERAL: PAINLEVEL_OUTOF10: 1

## 2025-05-06 ASSESSMENT — PAIN - FUNCTIONAL ASSESSMENT: PAIN_FUNCTIONAL_ASSESSMENT: 0-10

## 2025-05-06 NOTE — PROGRESS NOTES
Doing fanastic took staples and sutures out  Chief Complaint   Patient presents with    Left Hip - Post-op     4/15/25 LT RASHAWN REV / PARTIAL suture removal         This is a 60 y.o. male who is 2 weeks out from left total hip conversion from previous hardware.  Pain is under control with current medications.  No drainage from his incision no fevers or chills.  Progressing well with physical therapy and appropriately improving in function.  Patient is doing very well and is overall very happy with the surgery no other new issues or symptoms.    Left hip examination: Surgical incision well approximated no erythema no drainage.  no pain with range of motion neurovascular tact distally    X-rays of the hip were reviewed independently interpreted by me today, the show stable total hip arthroplasty no fracture dislocation or loosening    Impression plan: 60 y.o. male 2 weeks out from left total hip.  We discussed continuing physical therapy and home exercise program. Pain medications to be used as needed. Assistive devices as needed per PT. We discussed DVT prophylaxis until 6 weeks. No dentist until 3 months and thereafter dental prophylaxis for life. Activity as tolerated weightbearing as tolerated, hip precautions until 3 months. I have personally reviewed the OARRS report for the patient. This report is scanned into the electronic medical record. I have considered the risks of abuse, dependence, addiction and diversion. Follow up 4 weeks with xrays.  Patient is doing fantastic and that at that his sutures and staples

## 2025-05-07 ENCOUNTER — HOME CARE VISIT (OUTPATIENT)
Dept: HOME HEALTH SERVICES | Facility: HOME HEALTH | Age: 60
End: 2025-05-07
Payer: COMMERCIAL

## 2025-05-07 VITALS — HEART RATE: 98 BPM | DIASTOLIC BLOOD PRESSURE: 80 MMHG | SYSTOLIC BLOOD PRESSURE: 120 MMHG

## 2025-05-07 PROCEDURE — G0151 HHCP-SERV OF PT,EA 15 MIN: HCPCS

## 2025-05-07 ASSESSMENT — ENCOUNTER SYMPTOMS
PAIN LOCATION - PAIN SEVERITY: 1/10
PAIN LOCATION - RELIEVING FACTORS: NOTHING
PERSON REPORTING PAIN: PATIENT
PAIN: 1
SUBJECTIVE PAIN PROGRESSION: GRADUALLY IMPROVING
PAIN LOCATION: LEFT HIP
HIGHEST PAIN SEVERITY IN PAST 24 HOURS: 1/10
LOWEST PAIN SEVERITY IN PAST 24 HOURS: 0/10

## 2025-05-07 ASSESSMENT — ACTIVITIES OF DAILY LIVING (ADL)
AMBULATION ASSISTANCE ON FLAT SURFACES: 1
OASIS_M1830: 01
AMBULATION ASSISTANCE ON UNEVEN SURFACES: 1
HOME_HEALTH_OASIS: 00

## 2025-05-07 NOTE — CASE COMMUNICATION
DISCHARGE SUMMARY:    DISCIPLINE: PT  DATE OF DISCIPLINE DISCHARGE: 5.7.25  REASON FOR DISCHARGE: GOALS MET  COORDINATION NOTE: Patient is ind with mobility with minimal pain.   EVALUATION OF GOALS: Ind with therexs, ind with hip precautions and ind with mobility  SUMMARY OF CARE PROVIDED: instructed in therexs, hip precautions, pain management  DISCHARGE INSTRUCTIONS GIVEN: continue with therexs and ambulation. Continue to follow hip p recautions for thr  SERVICES REMAINING: none  NOMNC OBTAINED: yes

## 2025-05-14 ENCOUNTER — TELEPHONE (OUTPATIENT)
Dept: ORTHOPEDIC SURGERY | Facility: CLINIC | Age: 60
End: 2025-05-14
Payer: COMMERCIAL

## 2025-05-14 NOTE — TELEPHONE ENCOUNTER
I checked on the [facility] Portal to see if it was denied and it was not. It was approved. Auth#[id number] valid 05/14/25-05/14/26

## 2025-05-14 NOTE — TELEPHONE ENCOUNTER
[facility]  Durolane is denied, it is a plan exclusion. Asked Kd if that was just through his prescription benefits and I could submit through his medical he again stated its a plan exclusion and not a covered drug.  Auth#[id number]

## 2025-05-16 ENCOUNTER — PATIENT OUTREACH (OUTPATIENT)
Dept: CARE COORDINATION | Facility: CLINIC | Age: 60
End: 2025-05-16
Payer: COMMERCIAL

## 2025-05-16 NOTE — PROGRESS NOTES
Outreach call to patient to check in 30 days after hospital discharge to support smooth transition of care.  Left message.

## 2025-05-19 ENCOUNTER — APPOINTMENT (OUTPATIENT)
Dept: INFUSION THERAPY | Facility: CLINIC | Age: 60
End: 2025-05-19
Payer: COMMERCIAL

## 2025-05-23 ENCOUNTER — APPOINTMENT (OUTPATIENT)
Dept: ORTHOPEDIC SURGERY | Facility: CLINIC | Age: 60
End: 2025-05-23
Payer: COMMERCIAL

## 2025-05-23 DIAGNOSIS — M17.12 ARTHRITIS OF LEFT KNEE: Primary | ICD-10-CM

## 2025-05-23 ASSESSMENT — PAIN - FUNCTIONAL ASSESSMENT: PAIN_FUNCTIONAL_ASSESSMENT: 0-10

## 2025-05-23 ASSESSMENT — PAIN SCALES - GENERAL: PAINLEVEL_OUTOF10: 2

## 2025-05-23 NOTE — PROGRESS NOTES
This is a consultation from Dr. Madeleine Llanos DO for   Chief Complaint   Patient presents with    Left Knee - Arthritis     lt knee kendrick gomez&bill       This is a 60 y.o. male who presents for follow-up for his left knee.  Patient has left knee arthritis, he had cortisone injections a few weeks ago.  Unfortunately they did not help much and his pains been getting worse.  He is walking a lot more as he has been rehabbing from his hip surgery and his knee has been exacerbated in the last couple of weeks.  Sharp pain of the medial and anterior knee.  Associated with catching and popping.  No numbness or tingling no fevers or chills no shooting pain down the leg.    Physical Exam    There has been no interval change in this patient's past medical, surgical, medications, allergies, family history or social history since the most recent visit to a provider within our department. 14 point review of systems was performed, reviewed, and negative except for pertinent positives documented in the history of present illness.     Constitutional: well developed, well nourished male in no acute distress  Psychiatric: normal mood, appropriate affect  Eyes: sclera anicteric  HENT: normocephalic/atraumatic  CV: regular rate and rhythm   Respiratory: non labored breathing  Integumentary: no rash  Neurological: moves all extremities    Left knee exam: skin intact no lacerations or abrations. no effusion.  Tender medial and lateral joint line. negative log roll negative patellar grind. ROM 0-120. stable to varus and valgus stress at 0 and 30 degrees. negative lachman negative posterior drawer negative remedios. 5/5 ehl/fhl/gs/ta. silt s/s/sp/dp/t. 2+ dp/pt        Imaging:  Xrays were ordered by me, they were reviewed and independently interpreted by me today, they show severe degenerative changes in the left knee bone-on-bone arthritis    L Inj/Asp: L knee on 5/23/2025 11:30 AM  Indications: pain and joint swelling  Details: 22  G needle, anterolateral approach  Medications: 60 mg sodium hyaluronate 60 mg/3 mL    Discussion:  I discussed the conservative treatment options for knee osteoarthritis including but not limited to physical therapy, oral NSAIDS, activity and lifestyle modification, hyaluronic acid injections and corticosteroid injections. Pt has elected to undergo a hyaluronic acid injection today. I have explained the risk and benefits of an injection including the possibility of joint infection, bleeding, damage to cartilage, allergic reaction. Patient verbalized understanding and gave verbal consent wishes to proceed with a intra-articular hyaluronic acid injection for their knee.    Procedure:  After discussing the risk and benefits of the procedure, we proceeded with an intra-articular left knee injection. We discussed the risks and benefits and potential morbidity related to the treatment, and to the prescription medication administered in the injection    With the patient's informed verbal consent, the left knee were prepped in standard sterile fashion with Chlorhexidine. The skin was then anesthetized with ethyl chloride spray and cleaned again with Chlorhexidine. The left knee was then apirated/injected with a prefilled 20-gauge syringe of 3ml/60mg Durolane using the lateral approach without complications.  The patient tolerated this well.  A bandaid was applied and the patient ambulated out of the clinic on ther own accord without difficulty. Patient was instructed to avoid physical activity for 24-48 hours to prevent the knees from swelling and may ice the knee as tolerated. Patient should contact the office if any signs of of infection appear: redness, fever, chills, drainage, swelling or warmth to the knee.        Procedure, treatment alternatives, risks and benefits explained, specific risks discussed. Consent was given by the patient. Immediately prior to procedure a time out was called to verify the correct patient,  procedure, equipment, support staff and site/side marked as required. Patient was prepped and draped in the usual sterile fashion.             Impression/Plan: This is a 60 y.o. male with severe left knee arthritis.  I had an in depth discussion with the patient regarding treatment options for arthritis and their relative risks and benefits. We reviewed surgical and nonsurgical option for treatment. Treatments include anti inflammatory medications, physical therapy, weight loss, activity modification, use of assistive devices, injection therapies. We discussed current prescriptions and risks and benefits of continuation of prescription medication as apporpriate. We discussed that arthritis is often progressive over time, an in end stage arthritis surgical interventions can be considered, including arthroplasty. All questions were answered and the patient voiced their understanding.  Will get him set up with gel injection today, I will see him back as needed for his knee.  He is considering arthroplasty in the future think this is reasonable.  He is still recovering from his hip.    BMI Readings from Last 1 Encounters:   04/15/25 30.68 kg/m²      Lab Results   Component Value Date    CREATININE 0.70 04/16/2025     Tobacco Use: High Risk (5/23/2025)    Patient History     Smoking Tobacco Use: Never     Smokeless Tobacco Use: Current     Passive Exposure: Not on file      Computed MELD 3.0 unavailable. One or more values for this score either were not found within the given timeframe or did not fit some other criterion.  Computed MELD-Na unavailable. One or more values for this score either were not found within the given timeframe or did not fit some other criterion.       Lab Results   Component Value Date    HGBA1C 4.8 04/01/2025     Lab Results   Component Value Date    STAPHMRSASCR No Staphylococcus aureus isolated 04/01/2025

## 2025-06-17 ENCOUNTER — APPOINTMENT (OUTPATIENT)
Dept: INFUSION THERAPY | Facility: CLINIC | Age: 60
End: 2025-06-17
Payer: COMMERCIAL

## 2025-06-17 VITALS
DIASTOLIC BLOOD PRESSURE: 80 MMHG | WEIGHT: 221.56 LBS | RESPIRATION RATE: 18 BRPM | TEMPERATURE: 98.1 F | OXYGEN SATURATION: 97 % | BODY MASS INDEX: 31.79 KG/M2 | SYSTOLIC BLOOD PRESSURE: 154 MMHG | HEART RATE: 82 BPM

## 2025-06-17 DIAGNOSIS — K51.011 ULCERATIVE PANCOLITIS WITH RECTAL BLEEDING (MULTI): ICD-10-CM

## 2025-06-17 PROCEDURE — 96413 CHEMO IV INFUSION 1 HR: CPT | Performed by: NURSE PRACTITIONER

## 2025-06-17 RX ORDER — DIPHENHYDRAMINE HCL 25 MG
25 CAPSULE ORAL ONCE
Status: COMPLETED | OUTPATIENT
Start: 2025-06-17 | End: 2025-06-17

## 2025-06-17 RX ORDER — ALBUTEROL SULFATE 0.83 MG/ML
3 SOLUTION RESPIRATORY (INHALATION) AS NEEDED
OUTPATIENT
Start: 2025-07-14

## 2025-06-17 RX ORDER — ACETAMINOPHEN 325 MG/1
650 TABLET ORAL ONCE
Status: COMPLETED | OUTPATIENT
Start: 2025-06-17 | End: 2025-06-17

## 2025-06-17 RX ORDER — ACETAMINOPHEN 325 MG/1
650 TABLET ORAL ONCE
OUTPATIENT
Start: 2025-07-14 | End: 2025-07-14

## 2025-06-17 RX ORDER — DIPHENHYDRAMINE HCL 25 MG
25 CAPSULE ORAL ONCE
OUTPATIENT
Start: 2025-07-14 | End: 2025-07-14

## 2025-06-17 RX ORDER — EPINEPHRINE 0.3 MG/.3ML
0.3 INJECTION SUBCUTANEOUS EVERY 5 MIN PRN
OUTPATIENT
Start: 2025-07-14

## 2025-06-17 RX ORDER — DIPHENHYDRAMINE HYDROCHLORIDE 50 MG/ML
50 INJECTION, SOLUTION INTRAMUSCULAR; INTRAVENOUS AS NEEDED
OUTPATIENT
Start: 2025-07-14

## 2025-06-17 RX ORDER — FAMOTIDINE 10 MG/ML
20 INJECTION, SOLUTION INTRAVENOUS ONCE AS NEEDED
OUTPATIENT
Start: 2025-07-14

## 2025-06-17 RX ADMIN — ACETAMINOPHEN 650 MG: 325 TABLET ORAL at 10:16

## 2025-06-17 RX ADMIN — Medication 25 MG: at 10:17

## 2025-06-17 ASSESSMENT — ENCOUNTER SYMPTOMS
ABDOMINAL PAIN: 0
HEMATURIA: 0
LIGHT-HEADEDNESS: 0
LEG SWELLING: 0
EYE PROBLEMS: 0
TROUBLE SWALLOWING: 0
VOMITING: 0
WOUND: 0
FEVER: 0
BLOOD IN STOOL: 0
HEADACHES: 0
FATIGUE: 0
VOICE CHANGE: 0
ARTHRALGIAS: 1
FREQUENCY: 0
UNEXPECTED WEIGHT CHANGE: 0
NERVOUS/ANXIOUS: 0
DYSURIA: 0
NUMBNESS: 0
BRUISES/BLEEDS EASILY: 0
EXTREMITY WEAKNESS: 0
SORE THROAT: 0
APPETITE CHANGE: 0
DEPRESSION: 0
CHILLS: 0
SHORTNESS OF BREATH: 0
COUGH: 0
CONSTIPATION: 0
WHEEZING: 0
DIARRHEA: 0
PALPITATIONS: 0
NAUSEA: 0
DIZZINESS: 0
MYALGIAS: 1

## 2025-06-17 NOTE — PATIENT INSTRUCTIONS
Today :We administered acetaminophen, methylPREDNISolone sod succinate, diphenhydrAMINE, and inFLIXimab-dyyb (Inflectra) 900 mg in sodium chloride 0.9% 250 mL IV.     For:   1. Ulcerative pancolitis with rectal bleeding (Multi)         Your next appointment is due in:  8 WEEKS        Please read the  Medication Guide that was given to you and reviewed during todays visit.     (Tell all doctors including dentists that you are taking this medication)     Go to the emergency room or call 911 if:  -You have signs of allergic reaction:   -Rash, hives, itching.   -Swollen, blistered, peeling skin.   -Swelling of face, lips, mouth, tongue or throat.   -Tightness of chest, trouble breathing, swallowing or talking     Call your doctor:  - If IV / injection site gets red, warm, swollen, itchy or leaks fluid or pus.     (Leave dressing on your IV site for at least 2 hours and keep area clean and dry  - If you get sick or have symptoms of infection or are not feeling well for any reason.    (Wash your hands often, stay away from people who are sick)  - If you have side effects from your medication that do not go away or are bothersome.     (Refer to the teaching your nurse gave you for side effects to call your doctor about)    - Common side effects may include:  stuffy nose, headache, feeling tired, muscle aches, upset stomach  - Before receiving any vaccines     - Call the [facility] at   If:  - You get sick, are on antibiotics, have had a recent vaccine, have surgery or dental work and your doctor wants your visit rescheduled.  - You need to cancel and reschedule your visit for any reason. Call at least 2 days before your visit if you need to cancel.   - Your insurance changes before your next visit.    (We will need to get approval from your new insurance. This can take up to two weeks.)     The [facility] is opened Monday thru Friday. We are closed on weekends and holidays.   Voice mail  will take your call if the center is closed. If you leave a message please allow 24 hours for a call back during weekdays. If you leave a message on a weekend/holiday, we will call you back the next business day.    A pharmacist is available Monday - Friday from 8:30AM to 3:30PM to help answer any questions you may have about your prescriptions(s). Please call pharmacy at:    [facility]: [phone]  [facility]: [phone]  [facility]: [phone]

## 2025-06-17 NOTE — PROGRESS NOTES
[facility]   Infusion Clinic Note   Date: 2025   Name: Raleigh Owens  : 1965   MRN: [id number]         Reason for Visit: OP Infusion and Follow-up (PT HERE FOR INFLECTRA 900 MG INFUSION/NEXT APPT: 8 WEEKS)         Today: We administered acetaminophen, methylPREDNISolone sod succinate, diphenhydrAMINE, and inFLIXimab-dyyb (Inflectra) 900 mg in sodium chloride 0.9% 250 mL IV.       Ordered By: Sagar Michael MD       For a Diagnosis of: Ulcerative pancolitis with rectal bleeding (Multi)       At today's visit patient accompanied by: Self      Today's Vitals:   Vitals:    25 0947 25 1151   BP: 151/89  Comment: NURSE NOTIFIED 154/80   Pulse: 97 82   Resp: 18 18   Temp: 36.6 °C (97.9 °F) 36.7 °C (98.1 °F)   SpO2: 99% 97%   Weight: 101 kg (221 lb 9 oz)              Pre - Treatment Checklist:      - Previous reaction to current treatment: no      (Assess patient for the concerns below. Document provider notification as appropriate).  - Active or recent infection with/without current antibiotic use: no  - Recent or planned invasive dental work: no  - Recent or planned surgeries: yes, HIP REPLACEMENT ON 4/15  - Recently received or plans to receive vaccinations: no  - Has treatment related toxicities: no  - Any chance may be pregnant:  n/a      Pain: 0   - Is the pain different from normal: n/a   - Is prescribing Doctor aware:  n/a      Labs: N/A      Fall Risk Screening: Poe Fall Risk  History of Falling, Immediate or Within 3 Months: No  Secondary Diagnosis: No  Ambulatory Aid: Walks without aid/bedrest/nurse assist  Intravenous Therapy/Heparin Lock: Yes  Gait/Transferring: Normal/bedrest/immobile  Mental Status: Oriented to own ability  Poe Fall Risk Score: 20       Review Of Systems:  Review of Systems   Constitutional:  Negative for appetite change, chills, fatigue, fever and unexpected weight change.   HENT:   Negative for hearing loss, mouth sores, sore  "throat, tinnitus, trouble swallowing and voice change.    Eyes:  Negative for eye problems.   Respiratory:  Negative for cough, shortness of breath and wheezing.    Cardiovascular:  Negative for chest pain, leg swelling and palpitations.   Gastrointestinal:  Negative for abdominal pain, blood in stool, constipation, diarrhea, nausea and vomiting.   Genitourinary:  Negative for dysuria, frequency and hematuria.    Musculoskeletal:  Positive for arthralgias and myalgias.        GENERALIZED ARTHRITIS PAIN   Skin:  Negative for itching, rash and wound.   Neurological:  Negative for dizziness, extremity weakness, headaches, light-headedness and numbness.   Hematological:  Does not bruise/bleed easily.   Psychiatric/Behavioral:  Negative for depression. The patient is not nervous/anxious.          Infusion Readiness:  - Assessment Concerns Related to Infusion: No  - Provider notified: n/a      New Patient Education:    N/A (returning patient for continuation of therapy. Ongoing education provided as needed.)        Treatment Conditions & Drug Specific Questions:    InFLIXimab  (AVSOLA, INFLECTRA, REMICADE, RENFLEXIS)    (Unless otherwise specified on patient specific therapy plan):     TREATMENT CONDITIONS:  Unless otherwise specified on patient specific therapy plan HOLD and notify Provider prior to proceeding with today's infusion if patient with:  o Positive T-Spot  o Reactive Hep B sAg and/or Hep B Core Antibody    Lab Results   Component Value Date    TBSIN Negative 02/15/2023    QFG NEGATIVE 01/25/2018      Lab Results   Component Value Date    HEPBSAG Nonreactive 06/03/2024      No results found for: \"NONUHFIRE\", \"NONUHSWGH\", \"NONUHFISH\"  No results found for: \"HBCTI\", \"HEPBCAB\"  Lab Results   Component Value Date    HEPCAB NONREACTIVE 05/10/2022        Patient meets treatment conditions? Yes    DRUG SPECIFIC QUESTIONS:    Any new or worsening signs / symptoms of heart failure which may include things like " worsening shortness of breath, swelling, fatigue? No   (If yes notify provider before proceeding with today's infusion. New onset or worsening HF have been reported with infliximab)    REMINDER:   WEIGHT BASED DRUG     Recommended Vitals/Observation:  Vitals:     Induction: Obtain vital signs every 30 minutes; at end of observation period and as needed.     Maintenance: Obtain vital signs at start and end of infusions  Observation:     Induction: Patient is monitored for 30 minutes post-infusion      Maintenance: No observation.        Weight Based Drug Calculations:    WEIGHT BASED DRUGS: Infliximab (REMICADE, INFLECTRA, RENFLEXIS)   Patient's dosing weight (kg): 98KG     10% weight variance for prescribed treatment: 88.2 kg to 107.8 kg     Patient's weight today:   Vitals:    06/17/25 0947   Weight: 101 kg (221 lb 9 oz)         weight range for prescribed dose:     Patient weight today falls outside of 10% variance or  weight range: No     Home Care pharmacist informed of weight variance: Not applicable    Doses that are weight based have an acceptable variance rule within 10% of the prescribed   order and/or within  weight range. If patient weight on day of infusion falls   outside of the 10% variance, or weight range, infusion is administered and   pharmacy contacted regarding future dosing adjustments, per policy.      Post Treatment: Patient tolerated treatment without issue and was discharged in no apparent distress.      Note Authored / Patient Cared for By: Bertha Cooney RN

## 2025-07-13 LAB
ATRIAL RATE: 88 BPM
P AXIS: 37 DEGREES
P OFFSET: 182 MS
P ONSET: 126 MS
PR INTERVAL: 170 MS
Q ONSET: 211 MS
QRS COUNT: 14 BEATS
QRS DURATION: 88 MS
QT INTERVAL: 378 MS
QTC CALCULATION(BAZETT): 457 MS
QTC FREDERICIA: 429 MS
R AXIS: -20 DEGREES
T AXIS: 35 DEGREES
T OFFSET: 400 MS
VENTRICULAR RATE: 88 BPM

## 2025-08-12 ENCOUNTER — APPOINTMENT (OUTPATIENT)
Dept: INFUSION THERAPY | Facility: CLINIC | Age: 60
End: 2025-08-12
Payer: COMMERCIAL

## 2025-08-12 VITALS
HEART RATE: 76 BPM | DIASTOLIC BLOOD PRESSURE: 92 MMHG | TEMPERATURE: 97.9 F | OXYGEN SATURATION: 99 % | SYSTOLIC BLOOD PRESSURE: 161 MMHG | BODY MASS INDEX: 32.5 KG/M2 | WEIGHT: 226.52 LBS | RESPIRATION RATE: 18 BRPM

## 2025-08-12 DIAGNOSIS — K51.011 ULCERATIVE PANCOLITIS WITH RECTAL BLEEDING (MULTI): ICD-10-CM

## 2025-08-12 PROCEDURE — 96375 TX/PRO/DX INJ NEW DRUG ADDON: CPT | Performed by: NURSE PRACTITIONER

## 2025-08-12 PROCEDURE — 96413 CHEMO IV INFUSION 1 HR: CPT | Performed by: NURSE PRACTITIONER

## 2025-08-12 RX ORDER — ACETAMINOPHEN 325 MG/1
650 TABLET ORAL ONCE
OUTPATIENT
Start: 2025-10-07 | End: 2025-10-07

## 2025-08-12 RX ORDER — FAMOTIDINE 10 MG/ML
20 INJECTION, SOLUTION INTRAVENOUS ONCE AS NEEDED
OUTPATIENT
Start: 2025-10-07

## 2025-08-12 RX ORDER — DIPHENHYDRAMINE HCL 25 MG
25 CAPSULE ORAL ONCE
OUTPATIENT
Start: 2025-10-07 | End: 2025-10-07

## 2025-08-12 RX ORDER — ACETAMINOPHEN 325 MG/1
650 TABLET ORAL ONCE
Status: COMPLETED | OUTPATIENT
Start: 2025-08-12 | End: 2025-08-12

## 2025-08-12 RX ORDER — EPINEPHRINE 0.3 MG/.3ML
0.3 INJECTION SUBCUTANEOUS EVERY 5 MIN PRN
OUTPATIENT
Start: 2025-10-07

## 2025-08-12 RX ORDER — ALBUTEROL SULFATE 0.83 MG/ML
3 SOLUTION RESPIRATORY (INHALATION) AS NEEDED
OUTPATIENT
Start: 2025-10-07

## 2025-08-12 RX ORDER — DIPHENHYDRAMINE HYDROCHLORIDE 50 MG/ML
50 INJECTION, SOLUTION INTRAMUSCULAR; INTRAVENOUS AS NEEDED
OUTPATIENT
Start: 2025-10-07

## 2025-08-12 RX ORDER — DIPHENHYDRAMINE HCL 25 MG
25 CAPSULE ORAL ONCE
Status: COMPLETED | OUTPATIENT
Start: 2025-08-12 | End: 2025-08-12

## 2025-08-12 RX ADMIN — ACETAMINOPHEN 650 MG: 325 TABLET ORAL at 10:13

## 2025-08-12 RX ADMIN — Medication 25 MG: at 10:13

## 2025-08-12 ASSESSMENT — ENCOUNTER SYMPTOMS
ARTHRALGIAS: 1
HEMATURIA: 0
ABDOMINAL PAIN: 0
FATIGUE: 0
FEVER: 0
CHILLS: 0
UNEXPECTED WEIGHT CHANGE: 0
SORE THROAT: 0
COUGH: 0
DEPRESSION: 0
EYE PROBLEMS: 0
DIARRHEA: 0
TROUBLE SWALLOWING: 0
DIZZINESS: 0
BLOOD IN STOOL: 0
VOICE CHANGE: 0
LIGHT-HEADEDNESS: 0
WHEEZING: 0
SHORTNESS OF BREATH: 0
CONSTIPATION: 0
VOMITING: 0
FREQUENCY: 0
LEG SWELLING: 0
BRUISES/BLEEDS EASILY: 0
APPETITE CHANGE: 0
EXTREMITY WEAKNESS: 0
WOUND: 0
DYSURIA: 0
NUMBNESS: 0
NAUSEA: 0
HEADACHES: 0
MYALGIAS: 0
PALPITATIONS: 0
NERVOUS/ANXIOUS: 0

## 2025-09-12 ENCOUNTER — APPOINTMENT (OUTPATIENT)
Dept: ORTHOPEDIC SURGERY | Facility: CLINIC | Age: 60
End: 2025-09-12
Payer: COMMERCIAL

## 2025-09-12 DIAGNOSIS — M25.562 LEFT KNEE PAIN, UNSPECIFIED CHRONICITY: ICD-10-CM

## 2025-10-07 ENCOUNTER — APPOINTMENT (OUTPATIENT)
Dept: INFUSION THERAPY | Facility: CLINIC | Age: 60
End: 2025-10-07
Payer: COMMERCIAL

## (undated) DEVICE — Device

## (undated) DEVICE — SUTURE, STRATAFIX, 3-0 MONOCRYL PLUS, PS-2 SPIRAL UNDYED

## (undated) DEVICE — NEEDLE, SPINAL, 20 G X 3.5 IN, YELLOW HUB

## (undated) DEVICE — SUTURE, ETHIBOND, 2, 30 IN, CT2, GREEN

## (undated) DEVICE — DRAPE, SHEET, U, W/ADHESIVE STRIP, IMPERVIOUS, 60 X 70 IN, DISPOSABLE, LF, STERILE

## (undated) DEVICE — COVER, TABLE, 44X90

## (undated) DEVICE — TIP, SUCTION, YANKAUER, FLEXIBLE

## (undated) DEVICE — SUTURE, ETHILON, 3-0, 18 IN, PS1, BLACK

## (undated) DEVICE — THERAPY UNIT, 14-DAY PREVENA PLUS 125

## (undated) DEVICE — DRAPE PACK, TOTAL HIP, CUSTOM, GEAUGA

## (undated) DEVICE — RETRIEVER, SUTURE, HEWSON

## (undated) DEVICE — SUTURE, ETHIBOND XTRA, 5 CCS, GRN/BR, LF

## (undated) DEVICE — DRESSING, MEPILEX BORDER, POST-OP AG, 4 X 12 IN

## (undated) DEVICE — SEALER, BIPOLAR, AQUA MANTYS 6.0

## (undated) DEVICE — HOOD, SURGICAL, FLYTE, T7 PLUS, PEEL AWAY SHIELD

## (undated) DEVICE — DRAPE, SHEET, 17 X 23 IN

## (undated) DEVICE — IRRIGATION SYSTEM, WOUND, PULSAVAC PLUS

## (undated) DEVICE — DRAPE, SHEET, UTILITY, NON ABSORBENT, 18 X 26 IN, LF

## (undated) DEVICE — KIT, MINOR, DOUBLE BASIN

## (undated) DEVICE — IRRIGATION SYSTEM, WOUND, SURGIPHOR, 450ML, STERILE

## (undated) DEVICE — SUTURE, VICRYL, 1, 24 IN, CTD, UNDYED

## (undated) DEVICE — SUTURE, CTD, VICRYL, 2-0, UND, BR, CT-2

## (undated) DEVICE — COVER, PLASTIC, MAYO STAND, 29.5IN X 55.5IN

## (undated) DEVICE — SUTURE, QUILL, BARBED, PDO, 2, 24 X 24CM, T8 36MM TAPER POINT, 1/2 CIRCLE

## (undated) DEVICE — DRESSING, PREVENA, PEEL AND PLACE, 20CM

## (undated) DEVICE — CATHETER TRAY, SURESTEP, 14FR, PRECONNECTED DRAIN BAG

## (undated) DEVICE — TRAY, FOLEY, DRAIN BAG, SURESTEP, 16FR, W/STATLOCK

## (undated) DEVICE — APPLICATOR, CHLORAPREP, W/ORANGE TINT, 26ML

## (undated) DEVICE — SOLUTION, IRRIGATION, USP, 0.9% SODIUM CHL, 3000ML, TITAN XL

## (undated) DEVICE — DRAPE, INCISE, ANTIMICROBIAL, IOBAN 2, STERI DRAPE, 23 X 33 IN, DISPOSABLE, STERILE

## (undated) DEVICE — SYRINGE, 50 CC, LUER LOCK